# Patient Record
Sex: FEMALE | Race: WHITE | NOT HISPANIC OR LATINO | URBAN - METROPOLITAN AREA
[De-identification: names, ages, dates, MRNs, and addresses within clinical notes are randomized per-mention and may not be internally consistent; named-entity substitution may affect disease eponyms.]

---

## 2017-06-06 ENCOUNTER — OUTPATIENT (OUTPATIENT)
Dept: OUTPATIENT SERVICES | Facility: HOSPITAL | Age: 47
LOS: 1 days | Discharge: HOME | End: 2017-06-06

## 2017-06-28 DIAGNOSIS — K29.50 UNSPECIFIED CHRONIC GASTRITIS WITHOUT BLEEDING: ICD-10-CM

## 2017-06-28 DIAGNOSIS — Z88.1 ALLERGY STATUS TO OTHER ANTIBIOTIC AGENTS STATUS: ICD-10-CM

## 2017-06-28 DIAGNOSIS — R10.13 EPIGASTRIC PAIN: ICD-10-CM

## 2017-06-28 DIAGNOSIS — F17.210 NICOTINE DEPENDENCE, CIGARETTES, UNCOMPLICATED: ICD-10-CM

## 2017-06-28 DIAGNOSIS — G47.33 OBSTRUCTIVE SLEEP APNEA (ADULT) (PEDIATRIC): ICD-10-CM

## 2020-06-30 ENCOUNTER — OUTPATIENT (OUTPATIENT)
Dept: OUTPATIENT SERVICES | Facility: HOSPITAL | Age: 50
LOS: 1 days | Discharge: HOME | End: 2020-06-30
Payer: OTHER MISCELLANEOUS

## 2020-06-30 VITALS
SYSTOLIC BLOOD PRESSURE: 173 MMHG | DIASTOLIC BLOOD PRESSURE: 82 MMHG | HEART RATE: 82 BPM | TEMPERATURE: 98 F | WEIGHT: 145.06 LBS | HEIGHT: 65.5 IN | RESPIRATION RATE: 18 BRPM | OXYGEN SATURATION: 99 %

## 2020-06-30 DIAGNOSIS — Z01.818 ENCOUNTER FOR OTHER PREPROCEDURAL EXAMINATION: ICD-10-CM

## 2020-06-30 DIAGNOSIS — G56.02 CARPAL TUNNEL SYNDROME, LEFT UPPER LIMB: ICD-10-CM

## 2020-06-30 DIAGNOSIS — Z98.890 OTHER SPECIFIED POSTPROCEDURAL STATES: Chronic | ICD-10-CM

## 2020-06-30 LAB
ACANTHOCYTES BLD QL SMEAR: SLIGHT — SIGNIFICANT CHANGE UP
ALBUMIN SERPL ELPH-MCNC: 4.7 G/DL — SIGNIFICANT CHANGE UP (ref 3.5–5.2)
ALP SERPL-CCNC: 44 U/L — SIGNIFICANT CHANGE UP (ref 30–115)
ALT FLD-CCNC: 12 U/L — SIGNIFICANT CHANGE UP (ref 0–41)
ANION GAP SERPL CALC-SCNC: 11 MMOL/L — SIGNIFICANT CHANGE UP (ref 7–14)
ANISOCYTOSIS BLD QL: SLIGHT — SIGNIFICANT CHANGE UP
APTT BLD: 37.5 SEC — SIGNIFICANT CHANGE UP (ref 27–39.2)
AST SERPL-CCNC: 14 U/L — SIGNIFICANT CHANGE UP (ref 0–41)
BASOPHILS # BLD AUTO: 0.26 K/UL — HIGH (ref 0–0.2)
BASOPHILS NFR BLD AUTO: 1.8 % — HIGH (ref 0–1)
BILIRUB SERPL-MCNC: 0.5 MG/DL — SIGNIFICANT CHANGE UP (ref 0.2–1.2)
BUN SERPL-MCNC: 5 MG/DL — LOW (ref 10–20)
CALCIUM SERPL-MCNC: 10.1 MG/DL — SIGNIFICANT CHANGE UP (ref 8.5–10.1)
CHLORIDE SERPL-SCNC: 103 MMOL/L — SIGNIFICANT CHANGE UP (ref 98–110)
CO2 SERPL-SCNC: 30 MMOL/L — SIGNIFICANT CHANGE UP (ref 17–32)
CREAT SERPL-MCNC: 0.8 MG/DL — SIGNIFICANT CHANGE UP (ref 0.7–1.5)
ELLIPTOCYTES BLD QL SMEAR: SLIGHT — SIGNIFICANT CHANGE UP
EOSINOPHIL # BLD AUTO: 0.5 K/UL — SIGNIFICANT CHANGE UP (ref 0–0.7)
EOSINOPHIL NFR BLD AUTO: 3.5 % — SIGNIFICANT CHANGE UP (ref 0–8)
GIANT PLATELETS BLD QL SMEAR: PRESENT — SIGNIFICANT CHANGE UP
GLUCOSE SERPL-MCNC: 94 MG/DL — SIGNIFICANT CHANGE UP (ref 70–99)
HCT VFR BLD CALC: 46.9 % — SIGNIFICANT CHANGE UP (ref 37–47)
HGB BLD-MCNC: 15.6 G/DL — SIGNIFICANT CHANGE UP (ref 12–16)
INR BLD: 0.98 RATIO — SIGNIFICANT CHANGE UP (ref 0.65–1.3)
LYMPHOCYTES # BLD AUTO: 30.7 % — SIGNIFICANT CHANGE UP (ref 20.5–51.1)
LYMPHOCYTES # BLD AUTO: 4.35 K/UL — HIGH (ref 1.2–3.4)
MANUAL SMEAR VERIFICATION: SIGNIFICANT CHANGE UP
MCHC RBC-ENTMCNC: 30.4 PG — SIGNIFICANT CHANGE UP (ref 27–31)
MCHC RBC-ENTMCNC: 33.3 G/DL — SIGNIFICANT CHANGE UP (ref 32–37)
MCV RBC AUTO: 91.2 FL — SIGNIFICANT CHANGE UP (ref 81–99)
MONOCYTES # BLD AUTO: 1.49 K/UL — HIGH (ref 0.1–0.6)
MONOCYTES NFR BLD AUTO: 10.5 % — HIGH (ref 1.7–9.3)
NEUTROPHILS # BLD AUTO: 6.96 K/UL — HIGH (ref 1.4–6.5)
NEUTROPHILS NFR BLD AUTO: 45.6 % — SIGNIFICANT CHANGE UP (ref 42.2–75.2)
NEUTS BAND # BLD: 3.5 % — SIGNIFICANT CHANGE UP (ref 0–6)
PLAT MORPH BLD: NORMAL — SIGNIFICANT CHANGE UP
PLATELET # BLD AUTO: 531 K/UL — HIGH (ref 130–400)
POIKILOCYTOSIS BLD QL AUTO: SIGNIFICANT CHANGE UP
POLYCHROMASIA BLD QL SMEAR: SLIGHT — SIGNIFICANT CHANGE UP
POTASSIUM SERPL-MCNC: 5.7 MMOL/L — HIGH (ref 3.5–5)
POTASSIUM SERPL-SCNC: 5.7 MMOL/L — HIGH (ref 3.5–5)
PROT SERPL-MCNC: 6.9 G/DL — SIGNIFICANT CHANGE UP (ref 6–8)
PROTHROM AB SERPL-ACNC: 11.3 SEC — SIGNIFICANT CHANGE UP (ref 9.95–12.87)
RBC # BLD: 5.14 M/UL — SIGNIFICANT CHANGE UP (ref 4.2–5.4)
RBC # FLD: 15.1 % — HIGH (ref 11.5–14.5)
RBC BLD AUTO: SIGNIFICANT CHANGE UP
SCHISTOCYTES BLD QL AUTO: SLIGHT — SIGNIFICANT CHANGE UP
SMUDGE CELLS # BLD: PRESENT — SIGNIFICANT CHANGE UP
SODIUM SERPL-SCNC: 144 MMOL/L — SIGNIFICANT CHANGE UP (ref 135–146)
VARIANT LYMPHS # BLD: 4.4 % — SIGNIFICANT CHANGE UP (ref 0–5)
WBC # BLD: 14.17 K/UL — HIGH (ref 4.8–10.8)
WBC # FLD AUTO: 14.17 K/UL — HIGH (ref 4.8–10.8)

## 2020-06-30 PROCEDURE — 93010 ELECTROCARDIOGRAM REPORT: CPT

## 2020-06-30 NOTE — H&P PST ADULT - HISTORY OF PRESENT ILLNESS
for above recommended procedure due to work related injury 6/2019  pain since then no relief with any therapies    PATIENT CURRENTLY DENIES CHEST PAIN  SHORTNESS OF BREATH  PALPITATIONS,  DYSURIA, OR UPPER RESPIRATORY INFECTION IN PAST 2 WEEKS  EXERCISE  TOLERANCE  1-2 FLIGHT OF STAIRS  WITHOUT SHORTNESS OF BREATH   denies travel outside the [place] in the past 30 days   pt denies any covid s/s, or tested positive in the past 2 weeks

## 2020-06-30 NOTE — H&P PST ADULT - NSICDXPASTMEDICALHX_GEN_ALL_CORE_FT
PAST MEDICAL HISTORY:  Anemia     Cervical herniated disc     SHANNON (obstructive sleep apnea)     Seasonal allergies

## 2020-07-01 ENCOUNTER — OUTPATIENT (OUTPATIENT)
Dept: OUTPATIENT SERVICES | Facility: HOSPITAL | Age: 50
LOS: 1 days | Discharge: HOME | End: 2020-07-01

## 2020-07-01 DIAGNOSIS — Z98.890 OTHER SPECIFIED POSTPROCEDURAL STATES: Chronic | ICD-10-CM

## 2020-07-01 DIAGNOSIS — Z02.9 ENCOUNTER FOR ADMINISTRATIVE EXAMINATIONS, UNSPECIFIED: ICD-10-CM

## 2020-07-01 LAB
ANION GAP SERPL CALC-SCNC: 10 MMOL/L — SIGNIFICANT CHANGE UP (ref 7–14)
BUN SERPL-MCNC: 6 MG/DL — LOW (ref 10–20)
CALCIUM SERPL-MCNC: 9.8 MG/DL — SIGNIFICANT CHANGE UP (ref 8.5–10.1)
CHLORIDE SERPL-SCNC: 102 MMOL/L — SIGNIFICANT CHANGE UP (ref 98–110)
CO2 SERPL-SCNC: 28 MMOL/L — SIGNIFICANT CHANGE UP (ref 17–32)
CREAT SERPL-MCNC: 0.7 MG/DL — SIGNIFICANT CHANGE UP (ref 0.7–1.5)
GLUCOSE SERPL-MCNC: 85 MG/DL — SIGNIFICANT CHANGE UP (ref 70–99)
POTASSIUM SERPL-MCNC: 5.1 MMOL/L — HIGH (ref 3.5–5)
POTASSIUM SERPL-SCNC: 5.1 MMOL/L — HIGH (ref 3.5–5)
SODIUM SERPL-SCNC: 140 MMOL/L — SIGNIFICANT CHANGE UP (ref 135–146)

## 2020-07-06 ENCOUNTER — OUTPATIENT (OUTPATIENT)
Dept: OUTPATIENT SERVICES | Facility: HOSPITAL | Age: 50
LOS: 1 days | Discharge: HOME | End: 2020-07-06

## 2020-07-06 DIAGNOSIS — Z98.890 OTHER SPECIFIED POSTPROCEDURAL STATES: Chronic | ICD-10-CM

## 2020-07-06 DIAGNOSIS — Z11.59 ENCOUNTER FOR SCREENING FOR OTHER VIRAL DISEASES: ICD-10-CM

## 2020-07-06 PROBLEM — D64.9 ANEMIA, UNSPECIFIED: Chronic | Status: ACTIVE | Noted: 2020-06-30

## 2020-07-06 PROBLEM — M50.20 OTHER CERVICAL DISC DISPLACEMENT, UNSPECIFIED CERVICAL REGION: Chronic | Status: ACTIVE | Noted: 2020-06-30

## 2020-07-06 PROBLEM — J30.2 OTHER SEASONAL ALLERGIC RHINITIS: Chronic | Status: ACTIVE | Noted: 2020-06-30

## 2020-07-09 ENCOUNTER — OUTPATIENT (OUTPATIENT)
Dept: OUTPATIENT SERVICES | Facility: HOSPITAL | Age: 50
LOS: 1 days | Discharge: HOME | End: 2020-07-09
Payer: OTHER MISCELLANEOUS

## 2020-07-09 VITALS
RESPIRATION RATE: 20 BRPM | TEMPERATURE: 98 F | OXYGEN SATURATION: 98 % | DIASTOLIC BLOOD PRESSURE: 63 MMHG | SYSTOLIC BLOOD PRESSURE: 143 MMHG | HEART RATE: 75 BPM

## 2020-07-09 VITALS
DIASTOLIC BLOOD PRESSURE: 88 MMHG | WEIGHT: 145.06 LBS | OXYGEN SATURATION: 98 % | HEART RATE: 73 BPM | TEMPERATURE: 98 F | HEIGHT: 65.5 IN | SYSTOLIC BLOOD PRESSURE: 131 MMHG | RESPIRATION RATE: 18 BRPM

## 2020-07-09 DIAGNOSIS — Z98.890 OTHER SPECIFIED POSTPROCEDURAL STATES: Chronic | ICD-10-CM

## 2020-07-09 PROCEDURE — 93010 ELECTROCARDIOGRAM REPORT: CPT

## 2020-07-09 RX ORDER — FAMOTIDINE 10 MG/ML
20 INJECTION INTRAVENOUS DAILY
Refills: 0 | Status: DISCONTINUED | OUTPATIENT
Start: 2020-07-09 | End: 2020-07-24

## 2020-07-09 RX ORDER — SODIUM CHLORIDE 9 MG/ML
1000 INJECTION, SOLUTION INTRAVENOUS
Refills: 0 | Status: DISCONTINUED | OUTPATIENT
Start: 2020-07-09 | End: 2020-07-24

## 2020-07-09 RX ORDER — ONDANSETRON 8 MG/1
4 TABLET, FILM COATED ORAL ONCE
Refills: 0 | Status: DISCONTINUED | OUTPATIENT
Start: 2020-07-09 | End: 2020-07-24

## 2020-07-09 RX ORDER — OXYCODONE HYDROCHLORIDE 5 MG/1
5 TABLET ORAL ONCE
Refills: 0 | Status: DISCONTINUED | OUTPATIENT
Start: 2020-07-09 | End: 2020-07-09

## 2020-07-09 RX ORDER — HYDROMORPHONE HYDROCHLORIDE 2 MG/ML
0.5 INJECTION INTRAMUSCULAR; INTRAVENOUS; SUBCUTANEOUS
Refills: 0 | Status: DISCONTINUED | OUTPATIENT
Start: 2020-07-09 | End: 2020-07-09

## 2020-07-09 RX ADMIN — FAMOTIDINE 20 MILLIGRAM(S): 10 INJECTION INTRAVENOUS at 14:41

## 2020-07-09 RX ADMIN — SODIUM CHLORIDE 100 MILLILITER(S): 9 INJECTION, SOLUTION INTRAVENOUS at 14:53

## 2020-07-09 NOTE — BRIEF OPERATIVE NOTE - NSICDXBRIEFPOSTOP_GEN_ALL_CORE_FT
POST-OP DIAGNOSIS:  Ulnar neuritis, left 09-Jul-2020 14:02:19  Leona Hancock  Left carpal tunnel syndrome 09-Jul-2020 14:02:15  Leona Hancock

## 2020-07-09 NOTE — BRIEF OPERATIVE NOTE - NSICDXBRIEFPREOP_GEN_ALL_CORE_FT
PRE-OP DIAGNOSIS:  Ulnar neuritis, left 09-Jul-2020 14:02:06  Leona Hancock  Left carpal tunnel syndrome 09-Jul-2020 14:02:01  Leona Hancock

## 2020-07-09 NOTE — CHART NOTE - NSCHARTNOTEFT_GEN_A_CORE
PACU ANESTHESIA ADMISSION NOTE        ____  Intubated  TV:______       Rate: ______      FiO2: ______    _x___  Patent Airway    _x___  Full return of protective reflexes    _x___  Full recovery from anesthesia / back to baseline status    Vitals:  T(C): 36.8  HR: 68  BP: 157/67  RR: 18  SpO2: 98%    Mental Status:  _x___ Awake   _____ Alert   _____ Drowsy   _____ Sedated    Nausea/Vomiting:  _x___  NO       ______Yes,   See Post - Op Orders         Pain Scale (0-10):  __0___    Treatment: _x___ None    ____ See Post - Op/PCA Orders    Post - Operative Fluids:   __x__ Oral   ____ See Post - Op Orders    Plan: Discharge:   _x___Home       _____Floor     _____Critical Care    _____  Other:_________________    Comments:  No anesthesia issues or complications noted.  Discharge when criteria met.

## 2020-07-09 NOTE — BRIEF OPERATIVE NOTE - NSICDXBRIEFPROCEDURE_GEN_ALL_CORE_FT
PROCEDURES:  Release, nerve, elbow 09-Jul-2020 14:01:51 left Leona Hancock  Endoscopic carpal tunnel release 09-Jul-2020 14:01:46 left Leona Hancock

## 2020-07-15 DIAGNOSIS — F17.210 NICOTINE DEPENDENCE, CIGARETTES, UNCOMPLICATED: ICD-10-CM

## 2020-07-15 DIAGNOSIS — G56.02 CARPAL TUNNEL SYNDROME, LEFT UPPER LIMB: ICD-10-CM

## 2020-07-15 DIAGNOSIS — G56.22 LESION OF ULNAR NERVE, LEFT UPPER LIMB: ICD-10-CM

## 2020-07-15 DIAGNOSIS — G47.33 OBSTRUCTIVE SLEEP APNEA (ADULT) (PEDIATRIC): ICD-10-CM

## 2020-07-15 DIAGNOSIS — D64.9 ANEMIA, UNSPECIFIED: ICD-10-CM

## 2020-11-02 ENCOUNTER — OUTPATIENT (OUTPATIENT)
Dept: OUTPATIENT SERVICES | Facility: HOSPITAL | Age: 50
LOS: 1 days | Discharge: HOME | End: 2020-11-02
Payer: OTHER MISCELLANEOUS

## 2020-11-02 VITALS
HEIGHT: 65 IN | TEMPERATURE: 98 F | HEART RATE: 68 BPM | SYSTOLIC BLOOD PRESSURE: 146 MMHG | DIASTOLIC BLOOD PRESSURE: 76 MMHG | WEIGHT: 156.09 LBS | RESPIRATION RATE: 16 BRPM | OXYGEN SATURATION: 99 %

## 2020-11-02 DIAGNOSIS — M25.512 PAIN IN LEFT SHOULDER: ICD-10-CM

## 2020-11-02 DIAGNOSIS — Z01.818 ENCOUNTER FOR OTHER PREPROCEDURAL EXAMINATION: ICD-10-CM

## 2020-11-02 DIAGNOSIS — Z98.890 OTHER SPECIFIED POSTPROCEDURAL STATES: Chronic | ICD-10-CM

## 2020-11-02 LAB
ALBUMIN SERPL ELPH-MCNC: 4.3 G/DL — SIGNIFICANT CHANGE UP (ref 3.5–5.2)
ALP SERPL-CCNC: 48 U/L — SIGNIFICANT CHANGE UP (ref 30–115)
ALT FLD-CCNC: 10 U/L — SIGNIFICANT CHANGE UP (ref 0–41)
ANION GAP SERPL CALC-SCNC: 9 MMOL/L — SIGNIFICANT CHANGE UP (ref 7–14)
AST SERPL-CCNC: 13 U/L — SIGNIFICANT CHANGE UP (ref 0–41)
BASOPHILS # BLD AUTO: 0.33 K/UL — HIGH (ref 0–0.2)
BASOPHILS NFR BLD AUTO: 2.2 % — HIGH (ref 0–1)
BILIRUB SERPL-MCNC: 0.6 MG/DL — SIGNIFICANT CHANGE UP (ref 0.2–1.2)
BUN SERPL-MCNC: 5 MG/DL — LOW (ref 10–20)
CALCIUM SERPL-MCNC: 9.9 MG/DL — SIGNIFICANT CHANGE UP (ref 8.5–10.1)
CHLORIDE SERPL-SCNC: 107 MMOL/L — SIGNIFICANT CHANGE UP (ref 98–110)
CO2 SERPL-SCNC: 29 MMOL/L — SIGNIFICANT CHANGE UP (ref 17–32)
CREAT SERPL-MCNC: 0.8 MG/DL — SIGNIFICANT CHANGE UP (ref 0.7–1.5)
EOSINOPHIL # BLD AUTO: 0.51 K/UL — SIGNIFICANT CHANGE UP (ref 0–0.7)
EOSINOPHIL NFR BLD AUTO: 3.5 % — SIGNIFICANT CHANGE UP (ref 0–8)
GLUCOSE SERPL-MCNC: 87 MG/DL — SIGNIFICANT CHANGE UP (ref 70–99)
HCT VFR BLD CALC: 47.7 % — HIGH (ref 37–47)
HGB BLD-MCNC: 15.5 G/DL — SIGNIFICANT CHANGE UP (ref 12–16)
IMM GRANULOCYTES NFR BLD AUTO: 0.2 % — SIGNIFICANT CHANGE UP (ref 0.1–0.3)
LYMPHOCYTES # BLD AUTO: 35.3 % — SIGNIFICANT CHANGE UP (ref 20.5–51.1)
LYMPHOCYTES # BLD AUTO: 5.2 K/UL — HIGH (ref 1.2–3.4)
MCHC RBC-ENTMCNC: 29.5 PG — SIGNIFICANT CHANGE UP (ref 27–31)
MCHC RBC-ENTMCNC: 32.5 G/DL — SIGNIFICANT CHANGE UP (ref 32–37)
MCV RBC AUTO: 90.7 FL — SIGNIFICANT CHANGE UP (ref 81–99)
MONOCYTES # BLD AUTO: 1.02 K/UL — HIGH (ref 0.1–0.6)
MONOCYTES NFR BLD AUTO: 6.9 % — SIGNIFICANT CHANGE UP (ref 1.7–9.3)
NEUTROPHILS # BLD AUTO: 7.63 K/UL — HIGH (ref 1.4–6.5)
NEUTROPHILS NFR BLD AUTO: 51.9 % — SIGNIFICANT CHANGE UP (ref 42.2–75.2)
NRBC # BLD: 0 /100 WBCS — SIGNIFICANT CHANGE UP (ref 0–0)
PLATELET # BLD AUTO: 593 K/UL — HIGH (ref 130–400)
POTASSIUM SERPL-MCNC: 6.1 MMOL/L — CRITICAL HIGH (ref 3.5–5)
POTASSIUM SERPL-SCNC: 6.1 MMOL/L — CRITICAL HIGH (ref 3.5–5)
PROT SERPL-MCNC: 6.3 G/DL — SIGNIFICANT CHANGE UP (ref 6–8)
RBC # BLD: 5.26 M/UL — SIGNIFICANT CHANGE UP (ref 4.2–5.4)
RBC # FLD: 14.8 % — HIGH (ref 11.5–14.5)
SODIUM SERPL-SCNC: 145 MMOL/L — SIGNIFICANT CHANGE UP (ref 135–146)
WBC # BLD: 14.72 K/UL — HIGH (ref 4.8–10.8)
WBC # FLD AUTO: 14.72 K/UL — HIGH (ref 4.8–10.8)

## 2020-11-02 PROCEDURE — 93010 ELECTROCARDIOGRAM REPORT: CPT

## 2020-11-02 NOTE — H&P PST ADULT - NSICDXPASTSURGICALHX_GEN_ALL_CORE_FT
PAST SURGICAL HISTORY:  H/O carpal tunnel repair left 7/2020    History of surgery c section, cholecystectomy, tonsilectomy, hernia repair

## 2020-11-02 NOTE — H&P PST ADULT - HISTORY OF PRESENT ILLNESS
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 Denies COVID S/S. Denies sick contacts. Advised to follow preventive measures for COVID-Verbalized understanding of instructions. Exercise jackie 3FOS.  Anesthesia Alert  NO--Difficult Airway  NO--History of neck surgery or radiation  YES--Limited ROM of neck  NO--History of Malignant hyperthermia  NO--Personal or family history of Pseudocholinesterase deficiency  NO--Prior Anesthesia Complication  NO--Latex Allergy  NO--Loose teeth  NO--History of Rheumatoid Arthritis  YES--SHANNON  YES-SOY ALLERGY

## 2020-11-02 NOTE — H&P PST ADULT - NSICDXPASTMEDICALHX_GEN_ALL_CORE_FT
PAST MEDICAL HISTORY:  Anemia     Cervical herniated disc     Chronic sinusitis, unspecified location     H/O leukocytosis     SHANNON (obstructive sleep apnea) not using her CPAP    Seasonal allergies

## 2020-11-03 ENCOUNTER — OUTPATIENT (OUTPATIENT)
Dept: OUTPATIENT SERVICES | Facility: HOSPITAL | Age: 50
LOS: 1 days | Discharge: HOME | End: 2020-11-03

## 2020-11-03 DIAGNOSIS — Z02.9 ENCOUNTER FOR ADMINISTRATIVE EXAMINATIONS, UNSPECIFIED: ICD-10-CM

## 2020-11-03 DIAGNOSIS — Z98.890 OTHER SPECIFIED POSTPROCEDURAL STATES: Chronic | ICD-10-CM

## 2020-11-03 PROBLEM — J32.9 CHRONIC SINUSITIS, UNSPECIFIED: Chronic | Status: ACTIVE | Noted: 2020-11-02

## 2020-11-03 PROBLEM — Z86.2 PERSONAL HISTORY OF DISEASES OF THE BLOOD AND BLOOD-FORMING ORGANS AND CERTAIN DISORDERS INVOLVING THE IMMUNE MECHANISM: Chronic | Status: ACTIVE | Noted: 2020-11-02

## 2020-11-03 LAB
POTASSIUM SERPL-MCNC: 5 MMOL/L — SIGNIFICANT CHANGE UP (ref 3.5–5)
POTASSIUM SERPL-SCNC: 5 MMOL/L — SIGNIFICANT CHANGE UP (ref 3.5–5)

## 2020-11-20 ENCOUNTER — OUTPATIENT (OUTPATIENT)
Dept: OUTPATIENT SERVICES | Facility: HOSPITAL | Age: 50
LOS: 1 days | Discharge: HOME | End: 2020-11-20

## 2020-11-20 DIAGNOSIS — Z98.890 OTHER SPECIFIED POSTPROCEDURAL STATES: Chronic | ICD-10-CM

## 2020-11-20 DIAGNOSIS — Z02.9 ENCOUNTER FOR ADMINISTRATIVE EXAMINATIONS, UNSPECIFIED: ICD-10-CM

## 2020-11-20 DIAGNOSIS — Z11.59 ENCOUNTER FOR SCREENING FOR OTHER VIRAL DISEASES: ICD-10-CM

## 2020-11-20 PROBLEM — G47.33 OBSTRUCTIVE SLEEP APNEA (ADULT) (PEDIATRIC): Chronic | Status: ACTIVE | Noted: 2020-06-30

## 2020-11-20 LAB
APTT BLD: 34.9 SEC — SIGNIFICANT CHANGE UP (ref 27–39.2)
INR BLD: 1.04 RATIO — SIGNIFICANT CHANGE UP (ref 0.65–1.3)
PROTHROM AB SERPL-ACNC: 12 SEC — SIGNIFICANT CHANGE UP (ref 9.95–12.87)

## 2020-11-20 NOTE — ASU PATIENT PROFILE, ADULT - PSH
H/O carpal tunnel repair  left 7/2020  History of surgery  c section, cholecystectomy, tonsilectomy, hernia repair

## 2020-11-20 NOTE — ASU PATIENT PROFILE, ADULT - PMH
Anemia    Cervical herniated disc    Chronic sinusitis, unspecified location    H/O leukocytosis    SHANNON (obstructive sleep apnea)  not using her CPAP  Seasonal allergies

## 2020-11-20 NOTE — ASU PATIENT PROFILE, ADULT - BARIATRIC
See Scanned Documents.  
Rooming documentation of social history includes tobacco screening only.    
no

## 2020-11-23 ENCOUNTER — RESULT REVIEW (OUTPATIENT)
Age: 50
End: 2020-11-23

## 2020-11-23 ENCOUNTER — OUTPATIENT (OUTPATIENT)
Dept: OUTPATIENT SERVICES | Facility: HOSPITAL | Age: 50
LOS: 1 days | Discharge: HOME | End: 2020-11-23
Payer: OTHER MISCELLANEOUS

## 2020-11-23 VITALS
RESPIRATION RATE: 20 BRPM | OXYGEN SATURATION: 97 % | HEART RATE: 90 BPM | DIASTOLIC BLOOD PRESSURE: 78 MMHG | SYSTOLIC BLOOD PRESSURE: 155 MMHG

## 2020-11-23 VITALS
RESPIRATION RATE: 18 BRPM | HEIGHT: 65 IN | OXYGEN SATURATION: 99 % | DIASTOLIC BLOOD PRESSURE: 80 MMHG | WEIGHT: 156.09 LBS | TEMPERATURE: 98 F | SYSTOLIC BLOOD PRESSURE: 116 MMHG | HEART RATE: 76 BPM

## 2020-11-23 DIAGNOSIS — Z98.890 OTHER SPECIFIED POSTPROCEDURAL STATES: Chronic | ICD-10-CM

## 2020-11-23 DIAGNOSIS — Z87.39 PERSONAL HISTORY OF OTHER DISEASES OF THE MUSCULOSKELETAL SYSTEM AND CONNECTIVE TISSUE: ICD-10-CM

## 2020-11-23 PROCEDURE — 88304 TISSUE EXAM BY PATHOLOGIST: CPT | Mod: 26

## 2020-11-23 RX ORDER — SODIUM CHLORIDE 9 MG/ML
1000 INJECTION, SOLUTION INTRAVENOUS
Refills: 0 | Status: DISCONTINUED | OUTPATIENT
Start: 2020-11-23 | End: 2020-12-07

## 2020-11-23 RX ORDER — ONDANSETRON 8 MG/1
4 TABLET, FILM COATED ORAL ONCE
Refills: 0 | Status: DISCONTINUED | OUTPATIENT
Start: 2020-11-23 | End: 2020-12-07

## 2020-11-23 RX ORDER — HYDROMORPHONE HYDROCHLORIDE 2 MG/ML
0.5 INJECTION INTRAMUSCULAR; INTRAVENOUS; SUBCUTANEOUS
Refills: 0 | Status: DISCONTINUED | OUTPATIENT
Start: 2020-11-23 | End: 2020-11-23

## 2020-11-23 RX ORDER — MORPHINE SULFATE 50 MG/1
2 CAPSULE, EXTENDED RELEASE ORAL
Refills: 0 | Status: DISCONTINUED | OUTPATIENT
Start: 2020-11-23 | End: 2020-11-23

## 2020-11-23 RX ORDER — OMEPRAZOLE 10 MG/1
1 CAPSULE, DELAYED RELEASE ORAL
Qty: 0 | Refills: 0 | DISCHARGE

## 2020-11-23 RX ORDER — IRON,CARBONYL 45 MG
1 TABLET ORAL
Qty: 0 | Refills: 0 | DISCHARGE

## 2020-11-23 RX ORDER — ACETAMINOPHEN 500 MG
650 TABLET ORAL ONCE
Refills: 0 | Status: DISCONTINUED | OUTPATIENT
Start: 2020-11-23 | End: 2020-12-07

## 2020-11-23 RX ORDER — CETIRIZINE HYDROCHLORIDE 10 MG/1
1 TABLET ORAL
Qty: 0 | Refills: 0 | DISCHARGE

## 2020-11-23 NOTE — PACU DISCHARGE NOTE - COMMENTS
Pt now SP GETA with supplemental left interscalene block without complication. See anesthesia record for PACU admission vital signs.

## 2020-11-23 NOTE — ASU DISCHARGE PLAN (ADULT/PEDIATRIC) - ASU DC SPECIAL INSTRUCTIONSFT
Post Operative Instructions for Shoulder Surgery    Your Surgery Included  [x ] Rotator Cuff Repair			  [x ] Debridement				  [x ] Biceps Tenodesis/Tenotomy	  [ ] Distal Clavicle Resection		  [ ] SLAP Repair  [ ] Instability Repair  [x ] Lysis of Adhesions/Manipulation  [ ] Other:  	  Call our office ([phone]) immediately if you experience any of the following:  •	Excessive bleeding or pus like drainage at the incision site  •	Uncontrollable pain not relieved by pain medication  •	Excessive swelling or redness at the incision site  •	Fever above 101.5 degrees not controlled with Tylenol or Motrin  •	Shortness of Breath  •	Any foul odor or blistering from the surgery site    Pain Management: You were given one or more of the following medication prescriptions before leaving the hospital. Have the prescriptions filled at a pharmacy on your way home and follow the instructions on the bottles.   Regional Anesthesia Injections (Blocks): You may have been given a regional nerve block either before or after surgery. This may numb your shoulder for 24-36 hours    Diet: Eat a bland diet for the first day after surgery. Progress your diet as tolerated. Constipation may occur with Narcotic usage, contact our office if you are experiencing constipation.    Activity: After you arrive at home, spend most of the first 24 hours resting in bed, on the couch, or in a reclining chair. After the first 24 hours at home, slowly increase your activity level based on your symptoms.    Dressing Change: Remove the dressing on the 3rd day. It is normal for some blood to be seen on the dressings. It is also normal for you to see apparent bruising on the skin around your shoulder when you remove the dressing. If present, leave the steri-strip tape across the incisions. If you are concerned by the drainage or the appearance of your shoulder, please call one of the numbers listed below. Keep incisions covered with Band-Aids/bandages.    Showering: You may shower on the 5th day after surgery if the wound is dry and clean, but do not let the wound soak in water until sutures are removed. Do not submerge in any water until after your postoperative appointment in clinic.    Shoulder Sling: You may have been sent home with a sling / pillow attachment holding your arm away from your body. You may remove the sling when changing clothes or bathing. Make sure to wear the sling while sleeping unless instructed otherwise. You may remove for exercises.    Shoulder Exercises: You may do these exercises for 2-5 mins five times a day in order to help regain your range of motion.  [x ] Shoulder Shrugs: Shrug your shoulders up and down  [x ] Pendulums: Bend forward allowing your arm to hang down in front of you. Gently swing your arm side-to-side and front to back  [x ] Elbow range of motion: Straighten and bend your elbow  [x ] Scapula Retractions: Squeeze shoulder blades together while slightly pulling them down  [ ] Passive Abduction: Have family member lift your arm away from your body bringing your elbow to the level of your shoulder  [ ] Shoulder rotation: With your arm at your side, have a family member rotate your arm internally and externally  [ ] Pulley exercises: Put a towel over the top of a door and face the door, use your good arm to pull your arm up in front of you    Follow Up: As Scheduled

## 2020-11-23 NOTE — BRIEF OPERATIVE NOTE - NSICDXBRIEFPROCEDURE_GEN_ALL_CORE_FT
PROCEDURES:  Shoulder adhesion release 23-Nov-2020 14:33:48  Jace Estrada  Subacromial decompression 23-Nov-2020 14:33:40  Jace Estrada  Arthroscopic debridement, shoulder, extensive 23-Nov-2020 14:33:33  Jace Estrada  Repair, rotator cuff, arthroscopic 23-Nov-2020 14:33:27  Jace Estrada

## 2020-11-25 LAB — SURGICAL PATHOLOGY STUDY: SIGNIFICANT CHANGE UP

## 2020-12-01 DIAGNOSIS — Z88.1 ALLERGY STATUS TO OTHER ANTIBIOTIC AGENTS STATUS: ICD-10-CM

## 2020-12-01 DIAGNOSIS — M75.112 INCOMPLETE ROTATOR CUFF TEAR OR RUPTURE OF LEFT SHOULDER, NOT SPECIFIED AS TRAUMATIC: ICD-10-CM

## 2020-12-01 DIAGNOSIS — M24.112 OTHER ARTICULAR CARTILAGE DISORDERS, LEFT SHOULDER: ICD-10-CM

## 2020-12-01 DIAGNOSIS — G47.33 OBSTRUCTIVE SLEEP APNEA (ADULT) (PEDIATRIC): ICD-10-CM

## 2020-12-01 DIAGNOSIS — M75.52 BURSITIS OF LEFT SHOULDER: ICD-10-CM

## 2020-12-01 DIAGNOSIS — M25.812 OTHER SPECIFIED JOINT DISORDERS, LEFT SHOULDER: ICD-10-CM

## 2021-07-22 NOTE — H&P PST ADULT - MS EXT PE MLT D E PC
Assessment / Plan  1. Right wrist pain  Please call to schedule:   Orthopedics: [phone]  - SERVICE TO ORTHOPEDICS    2. Anxiety  - Ashwagandha 500mg to 1500mg every morning as needed (Amazing Annette)  - hydroxyzine as needed at night for anxiety / sleep  - stop Lexapro  - THYROID STIMULATING HORMONE REFLEX; Future    3. Arthralgia of multiple sites  - possibly related to Lexapro, right wrist likely related to tendonitis although not improved with relafen and brace  - SEDIMENTATION RATE WESTERGREN; Future  - KIKI GABRIELA; Future  - C REACTIVE PROTEIN; Future  - CYCLIC CITRULLINATED PEPTIDE (CCP) AB (IGG); Future  - RHEUMATOID FACTOR; Future  - CBC WITH DIFFERENTIAL; Future    4. Routine lab draw  - has appointment for annual in October  - LIPID PANEL WITH REFLEX; Future  - COMPREHENSIVE MET PNL (FAST); Future     no cyanosis/no clubbing/no pedal edema

## 2022-01-12 NOTE — BRIEF OPERATIVE NOTE - ANTIBIOTIC PROTOCOL
Followed protocol Ilumya Counseling: I discussed with the patient the risks of tildrakizumab including but not limited to immunosuppression, malignancy, posterior leukoencephalopathy syndrome, and serious infections.  The patient understands that monitoring is required including a PPD at baseline and must alert us or the primary physician if symptoms of infection or other concerning signs are noted.

## 2022-05-18 ENCOUNTER — APPOINTMENT (OUTPATIENT)
Dept: ENDOCRINOLOGY | Facility: CLINIC | Age: 52
End: 2022-05-18

## 2022-05-18 ENCOUNTER — OUTPATIENT (OUTPATIENT)
Dept: OUTPATIENT SERVICES | Facility: HOSPITAL | Age: 52
LOS: 1 days | Discharge: HOME | End: 2022-05-18

## 2022-05-18 VITALS
BODY MASS INDEX: 24.69 KG/M2 | HEIGHT: 65.5 IN | SYSTOLIC BLOOD PRESSURE: 127 MMHG | DIASTOLIC BLOOD PRESSURE: 82 MMHG | HEART RATE: 80 BPM | TEMPERATURE: 98.2 F | WEIGHT: 150 LBS

## 2022-05-18 DIAGNOSIS — Z98.890 OTHER SPECIFIED POSTPROCEDURAL STATES: Chronic | ICD-10-CM

## 2022-05-18 DIAGNOSIS — E04.2 NONTOXIC MULTINODULAR GOITER: ICD-10-CM

## 2022-05-18 DIAGNOSIS — F32.A DEPRESSION, UNSPECIFIED: ICD-10-CM

## 2022-05-18 DIAGNOSIS — F32.5 MAJOR DEPRESSIVE DISORDER, SINGLE EPISODE, IN FULL REMISSION: ICD-10-CM

## 2022-05-18 DIAGNOSIS — E78.00 PURE HYPERCHOLESTEROLEMIA, UNSPECIFIED: ICD-10-CM

## 2022-05-18 PROBLEM — Z00.00 ENCOUNTER FOR PREVENTIVE HEALTH EXAMINATION: Status: ACTIVE | Noted: 2022-05-18

## 2022-05-18 NOTE — HISTORY OF PRESENT ILLNESS
[FreeTextEntry1] : patient has a multinodular goiter with technical difficulties during thyroid bipsy.

## 2022-05-18 NOTE — PHYSICAL EXAM

## 2022-06-21 ENCOUNTER — FORM ENCOUNTER (OUTPATIENT)
Age: 52
End: 2022-06-21

## 2022-07-18 ENCOUNTER — RX RENEWAL (OUTPATIENT)
Age: 52
End: 2022-07-18

## 2022-08-10 ENCOUNTER — APPOINTMENT (OUTPATIENT)
Dept: ORTHOPEDIC SURGERY | Facility: AMBULATORY SURGERY CENTER | Age: 52
End: 2022-08-10

## 2022-08-10 PROCEDURE — 29824 SHO ARTHRS SRG DSTL CLAVICLC: CPT | Mod: LT

## 2022-08-10 PROCEDURE — 29823 SHO ARTHRS SRG XTNSV DBRDMT: CPT | Mod: LT

## 2022-08-18 ENCOUNTER — APPOINTMENT (OUTPATIENT)
Dept: ORTHOPEDIC SURGERY | Facility: CLINIC | Age: 52
End: 2022-08-18

## 2022-08-18 PROCEDURE — 99024 POSTOP FOLLOW-UP VISIT: CPT

## 2022-08-18 NOTE — DISCUSSION/SUMMARY
[de-identified] :  Patient is doing well status post surgery and her pain has been well controlled.  She will continue physical therapy as directed.\par \par The patient was advised to rest/ice the area and can alternate with warm compresses as needed.  Instructed not to do any strenuous activity that would further aggravate their symptoms.\par \par She will take Tylenol or Motrin as needed for pain.  Patient will be out of work until her next evaluation and has a 100% temporary total disability.\par \par Patient will follow-up in 6 weeks for further evaluation.  All of the patient's questions/concerns were answered in detail.  \par \par Patient was seen under the supervision of Dr. Marley.\par

## 2022-08-18 NOTE — HISTORY OF PRESENT ILLNESS
[de-identified] :   Patient is a 52 year old female who reports office for 1st postop visit status post left shoulder arthroscopy/manipulation of adhesions done by Dr. Estrada on 08/10/2022.  She has been doing physical therapy which has been giving her relief with range of motion.  Her pain has been well controlled.

## 2022-08-18 NOTE — PHYSICAL EXAM
[Left] : left shoulder [Sitting] : sitting [] : negative Speed's [de-identified] : There is decent strength [TWNoteComboBox7] : active forward flexion 150 degrees [TWNoteComboBox4] : passive forward flexion 170 degrees [de-identified] : active abduction 100 degrees [TWNoteComboBox9] : passive abduction 150 degrees [TWNoteComboBox6] : internal rotation L2 [de-identified] : external rotation 90 degrees

## 2022-10-04 ENCOUNTER — APPOINTMENT (OUTPATIENT)
Dept: ORTHOPEDIC SURGERY | Facility: CLINIC | Age: 52
End: 2022-10-04

## 2022-10-04 PROCEDURE — 99024 POSTOP FOLLOW-UP VISIT: CPT

## 2022-10-04 NOTE — HISTORY OF PRESENT ILLNESS
[de-identified] : Pt is s/p left shoulder arthroscopy\par Doing well.\par Pain controlled\par \par NAD\par Left shoulder\par Incisions healed\par Mild diffuse TTP\par Compartments soft and NT\par ff 160\par er 50\par IR L1\par NVI\par \par s/p left shoulder arthroscopy\par went over the surgery in detail\par cont cons tx\par new pt script\par not working with temp total disability

## 2022-10-19 ENCOUNTER — APPOINTMENT (OUTPATIENT)
Dept: ORTHOPEDIC SURGERY | Facility: CLINIC | Age: 52
End: 2022-10-19

## 2022-10-31 ENCOUNTER — APPOINTMENT (OUTPATIENT)
Dept: ORTHOPEDIC SURGERY | Facility: CLINIC | Age: 52
End: 2022-10-31

## 2022-10-31 PROCEDURE — 99214 OFFICE O/P EST MOD 30 MIN: CPT | Mod: 24

## 2022-10-31 PROCEDURE — 99072 ADDL SUPL MATRL&STAF TM PHE: CPT

## 2022-10-31 NOTE — ASSESSMENT
[FreeTextEntry1] : Patient comes in for follow-up of her bilateral carpal tunnel and cubital tunnel.  She was post to have surgery on the right side however she has been dealing with a frozen shoulder on the left side.  She underwent another surgery and she is doing significantly better and is in therapy on the left side.  It is going to be another couple months.  She is now complaining of numbness and tingling in both of her hands.  She says it is basically the long finger through small fingers.  Then numbness and tingling resolving left side after I had done surgery however returned after having the problem with the shoulder.  She says the right side is worse than the left-hand side.  The right hand has numbness and tingling as well.\par \par B/L elbow:\par Tender at the ulna nerve\par Pain with flexion\par +tinels at the ulna nerve\par +hyperflexion test\par \par B/L hand: \par Tender volar wrist \par Good finger ROM \par +Tinels \par +Phalens \par +Compression test \par normal sensation\par \par We discussed the recommendation for cubital tunnel surgery- We reviewed that the goal of surgery is to prevent worsening and give the nerve a chance to recover.  If symptoms are constant there is a chance that the nerve is already too badly damaged and no longer has the potential to recover.  In addition the nerve recovers at the rate of an inch per month so recovery of hand function from compression of a nerve at the elbow can take many months to recover.  Motor end plates may degenerate prior to nerve recovery and therefore strength and atrophy may never recover.  Risks, benefits, and alternatives of surgery discussed with patient (and family). Risks including but not limited to infection, blood loss, tendon damage, muscle damage, nerve damage, stiffness, pain, no resolution of symptoms, CRPS, loss of function, potential for secondary surgery, loss of limb or life. Patient understands and was allowed to voice questions or concerns. They agree to surgery\par \par We discussed the recommendation for carpal tunnel surgery- We reviewed that the goal of surgery is to prevent worsening and give the nerve a chance to recover. If symptoms are constant there is a chance that the nerve is already too badly damaged and no longer has the potential to recover.Risks, benefits, and alternatives of surgery discussed with patient (and family).Risks including but not limited to infection, blood loss, tendon damage, muscle damage, nerve damage, stiffness, pain, no resolution of symptoms, CRPS, loss of function, potential for secondary surgery, loss of limb or life.Patient understands and was allowed to voice questions or concerns.They agree to surgery\par \par   Has bilateral carpal tunnel and cubital tunnel.  I believe that we should move forward with the right-hand side.  She the cubital tunnel bothers her as well as the carpal tunnel.  There are plenty of people who unfortunately come out negative for cubital tunnel an EMG NCV however still have it.  She has symptoms.  She also has symptoms of carpal tunnel.  The EMG NCV showed carpal tunnel.  I am recommending right endoscopic carpal tunnel release possible open and cubital tunnel release possible anterior transposition with adipofascial fat flap.  We need authorization for these.  She will follow up with me in another 2 months if we have not got authorization.  In addition we will consider a Medrol Dosepak to help with the numbness and tingling and swelling.

## 2022-11-02 ENCOUNTER — FORM ENCOUNTER (OUTPATIENT)
Age: 52
End: 2022-11-02

## 2022-11-14 ENCOUNTER — FORM ENCOUNTER (OUTPATIENT)
Age: 52
End: 2022-11-14

## 2022-11-16 ENCOUNTER — APPOINTMENT (OUTPATIENT)
Dept: ORTHOPEDIC SURGERY | Facility: CLINIC | Age: 52
End: 2022-11-16

## 2022-11-16 PROCEDURE — 99213 OFFICE O/P EST LOW 20 MIN: CPT

## 2022-11-16 PROCEDURE — 99072 ADDL SUPL MATRL&STAF TM PHE: CPT

## 2022-11-18 NOTE — ASSESSMENT
[FreeTextEntry1] :  recommended a new MRI  of the ankle suggested  additional physical therapy renewed Flexeril she remains totally disabled unable to work I will see her back in a few months\par  I did express concern that some of the symptoms present may be permanent

## 2022-11-18 NOTE — HISTORY OF PRESENT ILLNESS
[de-identified] : Kimo is a 52-year-old female seen  for follow up regarding her left ankle pain. she states that on June\par 15th 2019 she had an injury injured her ankles. The left side is still quite painful she has\par had MRIs in 2019 which were reviewed . She states the beginning she did do therapy but was not given very many\par visits she continues to complain of pain is mainly anterior and posterior  ankle She did get an MRI she had not been taking\par anti-inflammatories she continues to complain of pain. not yet able to go back to work. Her MRI had showed mild\par Achilles tendinosis and no other significant abnormalities no signs tarsal tunnel syndrome. She states that she continues\par to feel tight have pain posteriorly even though she has been going to therapy. Her therapist thinks she has made some\par improvements but is still tight. She also states that she is now getting a pain that goes up the medial side of the ankle\par to her knee will cause her knee to give out on her. She is concerned this is a sign of tarsal tunnel syndrome did have\par EMGs done 02/07/2022 her endurance is poor      able to walk about a block  and gets swollen did request therapy last visit\par but is not yet been granted

## 2022-11-18 NOTE — REASON FOR VISIT
[FreeTextEntry2] : work injury of 6/15/19 to her both ankles a\par re recently had surgery Dr. Estrada in August on her shoulder Dr. Hancock is requesting surgery for her right wrist last MRI left ankle 08/26/2021 therapy so far has not helped her ankle or calf

## 2022-11-18 NOTE — IMAGING
[de-identified] : Left Ankle: Inspection of the ankle, foot and lower leg is as follows: no abrasions or lacerations, no swelling, no\par erythema, no ecchymosis, no gross deformity and no swelling of calf \par Palpation of the ankle, foot and lower leg is as follows: Tenderness at Achilles tendon, lateral ligaments,\par Achilles tendon insertion and anterior ankle joint line. No tenderness at calf, lateral malleolus, medial malleolus,\par deltoid ligaments, lisfranc's joint, fifth metatarsal tuberosity, calcaneus, posterolateral ankle, posteromedial ankle and\par calcaneocuboid joint. No tenderness over peroneal tendons and posterior tibialis tendon. \par Range of motion of the ankle, foot and lower leg is as follows: Dorsiflexion to 15 degrees. Plantar flexion to\par 30 degrees. Inversion to 20 degrees. Eversion to 15 degrees. Strength testing of the ankle, foot and lower leg is\par as follows: Ankle dorsiflexion strength is 5/5, Ankle plantar flexion strength is 5/5, Ankle inversion strength is 5/5, Ankle\par eversion strength is 5/5, EHL strength is 5/5 and FHL strength is 5/5. Special Testing of the ankle, foot and lower leg\par are as follows: negative anterior drawer at ankle, normal Brito test, negative squeeze test at foot, no pain with\par external rotation of ankle and circumduction without pain. No mid-foot instability. \par Vascular testing of the ankle, foot and lower leg are as follows: Warm and well perfused. \par Sensation testing of the ankle, foot and lower leg are as follows: Sensation present to light touch in all\par distributions. Gait and function is as follows: non-antalgic gait and patient ambulates without assistive device.

## 2023-01-03 ENCOUNTER — APPOINTMENT (OUTPATIENT)
Dept: ORTHOPEDIC SURGERY | Facility: CLINIC | Age: 53
End: 2023-01-03
Payer: OTHER MISCELLANEOUS

## 2023-01-03 DIAGNOSIS — M77.12 LATERAL EPICONDYLITIS, LEFT ELBOW: ICD-10-CM

## 2023-01-03 PROCEDURE — 99214 OFFICE O/P EST MOD 30 MIN: CPT

## 2023-01-03 PROCEDURE — 99072 ADDL SUPL MATRL&STAF TM PHE: CPT

## 2023-01-03 NOTE — WORK
[Total] : total [Does not reveal pre-existing condition(s) that may affect treatment/prognosis] : does not reveal pre-existing condition(s) that may affect treatment/prognosis [Cannot return to work because ________] : cannot return to work because [unfilled] [I provided the services listed above] :  I provided the services listed above. [FreeTextEntry3] : lg

## 2023-01-03 NOTE — ASSESSMENT
[FreeTextEntry1] : Patient comes in for follow-up of her bilateral carpal tunnel and cubital tunnel.  She still having numbness and tingling.  She is having left elbow pain.  The numbness and tingling occurs sometimes on off.  The right side is worse than the left side.  The left elbow hurts her tremendously.\par \par B/L elbow:\par Tender at the ulna nerve\par Pain with flexion\par +tinels at the ulna nerve\par +hyperflexion test\par \par full active range of motion of the left shoulder, wrist and fingers\par full active range of motion of the left elbow\par Tenderness to palpation of the lateral epicondyle and proximal extensor supinator mass\par pain with resisted wrist extension and forearm supination\par 4/5 strength in supination and wrist extension, otherwise 5/5 strength\par median/ulnar/radial sensation intact to light touch\par ain/pin/ulnar motor intact\par palpable pulses CR<2s\par \par \par B/L hand: \par Tender volar wrist \par Good finger ROM \par +Tinels \par +Phalens \par +Compression test \par normal sensation\par \par We discussed the recommendation for cubital tunnel surgery- We reviewed that the goal of surgery is to prevent worsening and give the nerve a chance to recover.  If symptoms are constant there is a chance that the nerve is already too badly damaged and no longer has the potential to recover.  In addition the nerve recovers at the rate of an inch per month so recovery of hand function from compression of a nerve at the elbow can take many months to recover.  Motor end plates may degenerate prior to nerve recovery and therefore strength and atrophy may never recover.  Risks, benefits, and alternatives of surgery discussed with patient (and family). Risks including but not limited to infection, blood loss, tendon damage, muscle damage, nerve damage, stiffness, pain, no resolution of symptoms, CRPS, loss of function, potential for secondary surgery, loss of limb or life. Patient understands and was allowed to voice questions or concerns. They agree to surgery\par \par We discussed the recommendation for carpal tunnel surgery- We reviewed that the goal of surgery is to prevent worsening and give the nerve a chance to recover. If symptoms are constant there is a chance that the nerve is already too badly damaged and no longer has the potential to recover.Risks, benefits, and alternatives of surgery discussed with patient (and family).Risks including but not limited to infection, blood loss, tendon damage, muscle damage, nerve damage, stiffness, pain, no resolution of symptoms, CRPS, loss of function, potential for secondary surgery, loss of limb or life.Patient understands and was allowed to voice questions or concerns.They agree to surgery\par \par   Has bilateral carpal tunnel and cubital tunnel.  I believe that we should move forward with the right-hand side.  She the cubital tunnel bothers her as well as the carpal tunnel.  There are plenty of people who unfortunately come out negative for cubital tunnel an EMG NCV however still have it.  She has symptoms.  She also has symptoms of carpal tunnel.  The EMG NCV showed carpal tunnel.  I am recommending right endoscopic carpal tunnel release possible open and cubital tunnel release possible anterior transposition with adipofascial fat flap.  We need authorization for these.  She will follow up with me in another 2 months if we have not got authorization.  \par \par  there is no evidence of steroid injections helped for cubital tunnel symptoms.  The patient has had carpal tunnel for over 2 years.  The right side is not doing well.  I am recommending surgical intervention for the right side.  I gave her Medrol Dosepak for the left side.  As for the left elbow she has now lateral epicondylitis.  She says she has been having this pain ever since she has had her shoulder surgery.\par \par The patient was advised of the diagnosis. The natural history of the pathology was explained in full to the patient in layman's terms. All questions were answered. The risks and benefits of surgical and non-surgical treatment alternatives were explained in full to the patient.\par We discussed treatment options including nsaids, topical gels, tennis elbow strap, PT, activity modification, cortisone inj, sx .pt is aware that symptoms usually resolve on their own in 95-99% of people, but the timeframe is unknown.\par Home exercises/stretches were demonstrated and the patient practiced them as well- They are to do the exercises hourly and hold the stretch for 30 seconds. \par Avoid repetitive wrist flexion\par time was spent instructing the patient on appropriate placement of the elbow strap as well. \par In addition I would like authorization for therapy for the elbow.\par

## 2023-01-23 ENCOUNTER — FORM ENCOUNTER (OUTPATIENT)
Age: 53
End: 2023-01-23

## 2023-01-31 ENCOUNTER — APPOINTMENT (OUTPATIENT)
Dept: RHEUMATOLOGY | Facility: CLINIC | Age: 53
End: 2023-01-31
Payer: COMMERCIAL

## 2023-01-31 VITALS
HEART RATE: 82 BPM | DIASTOLIC BLOOD PRESSURE: 80 MMHG | SYSTOLIC BLOOD PRESSURE: 120 MMHG | WEIGHT: 160 LBS | HEIGHT: 65 IN | OXYGEN SATURATION: 98 % | BODY MASS INDEX: 26.66 KG/M2

## 2023-01-31 DIAGNOSIS — R76.8 OTHER SPECIFIED ABNORMAL IMMUNOLOGICAL FINDINGS IN SERUM: ICD-10-CM

## 2023-01-31 DIAGNOSIS — Z80.9 FAMILY HISTORY OF MALIGNANT NEOPLASM, UNSPECIFIED: ICD-10-CM

## 2023-01-31 DIAGNOSIS — Z87.19 PERSONAL HISTORY OF OTHER DISEASES OF THE DIGESTIVE SYSTEM: ICD-10-CM

## 2023-01-31 DIAGNOSIS — Z82.62 FAMILY HISTORY OF OSTEOPOROSIS: ICD-10-CM

## 2023-01-31 PROCEDURE — 99204 OFFICE O/P NEW MOD 45 MIN: CPT

## 2023-01-31 RX ORDER — OMEPRAZOLE 20 MG/1
20 CAPSULE, DELAYED RELEASE ORAL
Refills: 0 | Status: ACTIVE | COMMUNITY

## 2023-01-31 NOTE — PHYSICAL EXAM
[General Appearance - Alert] : alert [General Appearance - In No Acute Distress] : in no acute distress [Sclera] : the sclera and conjunctiva were normal [PERRL With Normal Accommodation] : pupils were equal in size, round, and reactive to light [Extraocular Movements] : extraocular movements were intact [Outer Ear] : the ears and nose were normal in appearance [Oropharynx] : the oropharynx was normal [Neck Appearance] : the appearance of the neck was normal [Neck Cervical Mass (___cm)] : no neck mass was observed [Jugular Venous Distention Increased] : there was no jugular-venous distention [Thyroid Diffuse Enlargement] : the thyroid was not enlarged [Thyroid Nodule] : there were no palpable thyroid nodules [Auscultation Breath Sounds / Voice Sounds] : lungs were clear to auscultation bilaterally [Heart Rate And Rhythm] : heart rate was normal and rhythm regular [Heart Sounds] : normal S1 and S2 [Heart Sounds Gallop] : no gallops [Murmurs] : no murmurs [Heart Sounds Pericardial Friction Rub] : no pericardial rub [Bowel Sounds] : normal bowel sounds [Abdomen Soft] : soft [Abdomen Tenderness] : non-tender [] : no hepato-splenomegaly [Abdomen Mass (___ Cm)] : no abdominal mass palpated [Abnormal Walk] : normal gait [Nail Clubbing] : no clubbing  or cyanosis of the fingernails [Musculoskeletal - Swelling] : no joint swelling seen [Motor Tone] : muscle strength and tone were normal [FreeTextEntry1] : Lichenoid lesions bilateral shoulders [Affect] : the affect was normal [Mood] : the mood was normal
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
## 2023-01-31 NOTE — ASSESSMENT
[FreeTextEntry1] : Positive rheumatoid factor\par -patient has joint pains that seem more related to degenerative arthritis rather than inflammatory arthritis. She has crepitus on exam indicating osteoarthritis of the knees, and ankle MRI reviewed and showed Achilles tendonitis and effusion. Pain in hands likely related to carpal tunnel syndrome. Discussed symptoms of RA, and she does have a strong family history of this. Will repeat labs RF, CCP and check hepatitis B and C, sjogrens serologies\par

## 2023-02-09 ENCOUNTER — APPOINTMENT (OUTPATIENT)
Dept: ORTHOPEDIC SURGERY | Facility: CLINIC | Age: 53
End: 2023-02-09

## 2023-02-09 NOTE — H&P PST ADULT - TEMPERATURE IN CELSIUS (DEGREES C)
Plan of care reviewed with pt:  -Transferred from PACU around 1805  -Still drowsy but responded well to voice, oriented x4  -Nauseated , no vomiting, nausea relived with Zofran. Due to nausea, hold scheduled Cephalexin, notified night shift nurse  -AAOx4, on 2L of O2 via NC, VS stable  -Right eye with gauze -cotton dressing with scant amount of blood on it, not saturated. Per , ok to give eye drop to right eye while pt awake, night shift nurse notified  -Pt said his pain is control  -Call light in reach, WCTM   36.6

## 2023-02-16 ENCOUNTER — APPOINTMENT (OUTPATIENT)
Dept: ORTHOPEDIC SURGERY | Facility: CLINIC | Age: 53
End: 2023-02-16
Payer: OTHER MISCELLANEOUS

## 2023-02-16 PROCEDURE — 99072 ADDL SUPL MATRL&STAF TM PHE: CPT

## 2023-02-16 PROCEDURE — 99213 OFFICE O/P EST LOW 20 MIN: CPT

## 2023-02-16 NOTE — IMAGING
[de-identified] : Left Ankle: Inspection of the ankle, foot and lower leg is as follows: no abrasions or lacerations, no swelling, no\par erythema, no ecchymosis, no gross deformity and no swelling of calf \par Palpation of the ankle, foot and lower leg is as follows: Tenderness at Achilles tendon, lateral ligaments,\par Achilles tendon insertion and anterior ankle joint line. No tenderness at calf, lateral malleolus, medial malleolus,\par deltoid ligaments, lisfranc's joint, fifth metatarsal tuberosity, calcaneus, posterolateral ankle, posteromedial ankle and\par calcaneocuboid joint. No tenderness over peroneal tendons and posterior tibialis tendon. \par Range of motion of the ankle, foot and lower leg is as follows: Dorsiflexion to 15 degrees. Plantar flexion to\par 30 degrees. Inversion to 20 degrees. Eversion to 15 degrees. Strength testing of the ankle, foot and lower leg is\par as follows: Ankle dorsiflexion strength is 5/5, Ankle plantar flexion strength is 5/5, Ankle inversion strength is 5/5, Ankle\par eversion strength is 5/5, EHL strength is 5/5 and FHL strength is 5/5. Special Testing of the ankle, foot and lower leg\par are as follows: negative anterior drawer at ankle, normal Brito test, negative squeeze test at foot, no pain with\par external rotation of ankle and circumduction without pain. No mid-foot instability. \par Vascular testing of the ankle, foot and lower leg are as follows: Warm and well perfused. \par Sensation testing of the ankle, foot and lower leg are as follows: Sensation present to light touch in all\par distributions. Gait and function is as follows: non-antalgic gait and patient ambulates without assistive device.

## 2023-02-16 NOTE — REASON FOR VISIT
[FreeTextEntry2] : work injury of 6/15/19 to her both ankles and feet\par Still doing therapy for her left shoulder  her nerve tests have shown right carpal tunnel syndrome OT had been denied after the request when in for carpal tunnel surgery

## 2023-02-16 NOTE — ASSESSMENT
[FreeTextEntry1] :   she remains totally disabled unable to work I will see her back in 3  months\par  I did express concern that some of the symptoms present may be permanent\par  she has appointment with Dr. Hancock next week it may be important to know how many visits of therapy were done and to note that she is not not meet any further improvement read regard to the right wrist symptoms and has been doing home therapy program without any improvement

## 2023-02-16 NOTE — HISTORY OF PRESENT ILLNESS
[de-identified] : Kimo is a 52-year-old female seen  for follow up regarding her left ankle pain. she states that on June\par 15th 2019 she had an injury injured her ankles. The left side is still quite painful she has\par had MRIs in 2019 which were reviewed . She states the beginning she did do therapy but was not given very many\par visits she continues to complain of pain is mainly anterior and posterior  ankle She did get an MRI she had not been taking\par anti-inflammatories she continues to complain of pain. not yet able to go back to work. Her MRI had showed mild\par Achilles tendinosis and no other significant abnormalities no signs tarsal tunnel syndrome. She states that she continues\par to feel tight have pain posteriorly even though she has been going to therapy. Her therapist thinks she has made some\par improvements but is still tight. She also states that she is now getting a pain that goes up the medial side of the ankle\par to her knee will cause her knee to give out on her. She is concerned this is a sign of tarsal tunnel syndrome did have\par EMGs done 02/07/2022 her endurance is poor      able to walk about a block  and gets swollen did request therapy last visit\par but is not yet been granted

## 2023-02-21 ENCOUNTER — APPOINTMENT (OUTPATIENT)
Dept: ORTHOPEDIC SURGERY | Facility: CLINIC | Age: 53
End: 2023-02-21
Payer: OTHER MISCELLANEOUS

## 2023-02-21 PROCEDURE — 99213 OFFICE O/P EST LOW 20 MIN: CPT

## 2023-02-21 PROCEDURE — 99072 ADDL SUPL MATRL&STAF TM PHE: CPT

## 2023-02-21 NOTE — ASSESSMENT
[FreeTextEntry1] : Patient comes in for follow-up of her bilateral carpal tunnel and cubital tunnel.  She still having numbness and tingling.  She is having left elbow pain.  The numbness and tingling occurs sometimes on off.  The right side is worse than the left side.  The left elbow hurts her tremendously.\par \par B/L elbow:\par Tender at the ulna nerve\par Pain with flexion\par +tinels at the ulna nerve\par +hyperflexion test\par \par B/L hand: \par Tender volar wrist \par Good finger ROM \par +Tinels \par +Phalens \par +Compression test \par normal sensation\par \par We discussed the recommendation for cubital tunnel surgery- We reviewed that the goal of surgery is to prevent worsening and give the nerve a chance to recover.  If symptoms are constant there is a chance that the nerve is already too badly damaged and no longer has the potential to recover.  In addition the nerve recovers at the rate of an inch per month so recovery of hand function from compression of a nerve at the elbow can take many months to recover.  Motor end plates may degenerate prior to nerve recovery and therefore strength and atrophy may never recover.  Risks, benefits, and alternatives of surgery discussed with patient (and family). Risks including but not limited to infection, blood loss, tendon damage, muscle damage, nerve damage, stiffness, pain, no resolution of symptoms, CRPS, loss of function, potential for secondary surgery, loss of limb or life. Patient understands and was allowed to voice questions or concerns. They agree to surgery\par \par We discussed the recommendation for carpal tunnel surgery- We reviewed that the goal of surgery is to prevent worsening and give the nerve a chance to recover. If symptoms are constant there is a chance that the nerve is already too badly damaged and no longer has the potential to recover.Risks, benefits, and alternatives of surgery discussed with patient (and family).Risks including but not limited to infection, blood loss, tendon damage, muscle damage, nerve damage, stiffness, pain, no resolution of symptoms, CRPS, loss of function, potential for secondary surgery, loss of limb or life.Patient understands and was allowed to voice questions or concerns.They agree to surgery\par \par   Has bilateral carpal tunnel and cubital tunnel.  I believe that we should move forward with the right-hand side.  She the cubital tunnel bothers her as well as the carpal tunnel.  There are plenty of people who unfortunately come out negative for cubital tunnel an EMG NCV however still have it.  She has symptoms.  She also has symptoms of carpal tunnel.  The EMG NCV showed carpal tunnel.  I am recommending right endoscopic carpal tunnel release possible open and cubital tunnel release possible anterior transposition with adipofascial fat flap.  We need authorization for these.  She will follow up with me in another 2 months if we have not got authorization.  \par \par  there is no evidence of steroid injections that help for cubital tunnel symptoms.  The patient has had carpal tunnel for over 2 years.  The right side is not doing well.  I am recommending surgical intervention for the right side.  The patient was doing therapy on her own.  She failed this as a home exercise program.  She was denied authorization for therapy recently.  She has not had any improvements since I have been seeing her and it has been 2 years.  She has not improved.  I believe surgery is her only option at this time.

## 2023-03-16 ENCOUNTER — APPOINTMENT (OUTPATIENT)
Dept: ORTHOPEDIC SURGERY | Facility: CLINIC | Age: 53
End: 2023-03-16
Payer: OTHER MISCELLANEOUS

## 2023-03-16 PROCEDURE — 99072 ADDL SUPL MATRL&STAF TM PHE: CPT

## 2023-03-16 PROCEDURE — 99213 OFFICE O/P EST LOW 20 MIN: CPT

## 2023-03-16 NOTE — HISTORY OF PRESENT ILLNESS
[de-identified] : Pt is s/p left shoulder arthroscopy\par Doing well.\par Pain controlled\par \par Still having scapula spasms\par \par NAD\par Left shoulder\par Incisions healed\par Mild diffuse TTP\par Compartments soft and NT\par ff 170\par er 50\par IR L1\par NVI\par \par s/p left shoulder arthroscopy\par went over the surgery in detail\par cont cons tx\par new pt script\par will refer to PM for trigger pt injections\par not working with temp total disability

## 2023-03-21 ENCOUNTER — APPOINTMENT (OUTPATIENT)
Dept: PAIN MANAGEMENT | Facility: CLINIC | Age: 53
End: 2023-03-21
Payer: OTHER MISCELLANEOUS

## 2023-03-21 VITALS
WEIGHT: 160 LBS | BODY MASS INDEX: 26.66 KG/M2 | DIASTOLIC BLOOD PRESSURE: 89 MMHG | HEIGHT: 65 IN | SYSTOLIC BLOOD PRESSURE: 128 MMHG | HEART RATE: 90 BPM

## 2023-03-21 DIAGNOSIS — Z87.39 PERSONAL HISTORY OF OTHER DISEASES OF THE MUSCULOSKELETAL SYSTEM AND CONNECTIVE TISSUE: ICD-10-CM

## 2023-03-21 PROCEDURE — 99204 OFFICE O/P NEW MOD 45 MIN: CPT

## 2023-03-21 PROCEDURE — 96102W: CUSTOM

## 2023-03-22 PROBLEM — Z87.39 HISTORY OF ARTHRITIS: Status: RESOLVED | Noted: 2023-03-21 | Resolved: 2023-03-22

## 2023-03-22 NOTE — HISTORY OF PRESENT ILLNESS
[FreeTextEntry1] : Ms. Castillo is a 53 year old female furred by referred by Dr. Estrada complaining of left shoulder pain. The pain started after work related injury that occurred in 6-. She works at [employer]. She states that she was getting a object and tripped over something and injured her neck, shoulders, bilateral wrists, bilateral elbows, left ankle.  She is status post left shoulder arthroscopic surgery with complaints of the spasms particularly around the left scapula region.\par \par She continues today with ongoing left shoulder pain. She is currently under the care of orthopedics and is being recommended to undergo trigger point injections to the left shoulder region. She states the pain in the shoulder is severe. She has spasms and stiffness. She states when she is in physical therapy she is unable to due certain motions secondary to the spasms. \par \par The patient has had this pain for 4 years.  Patient describes the pain as moderate to severe. During the last month the pain has been constant with symptoms worsening no typical pattern. Pain described as burning, sharp, shooting, cutting.  Pain is associated with numbness/pins and needles into the left upper extremity.  Patient has weakness in the left upper extremity.  Pain is not changed with lying down, standing, sitting.  Bowel or bladder habits have not changed.\par  \par ACTIVITIES: Patient could walk less than a blocks before the pain starts. The patient sometimes lies down because of pain.  Patient uses no assistive walking device at this time.  Patient has difficulty performing household chores, going to work, doing yardwork or shopping, socializing with friends, participating in recreational activities or exercise at this time.\par \par PRIOR PAIN TREATMENTS:  Moderate relief with surgery, heat treatment and cold treatment.\par \par Prior Pain Medications:  Naproxen, Flexeril.\par

## 2023-03-22 NOTE — DATA REVIEWED
[FreeTextEntry1] : SOAPP: Scored a 0 , low risk.\par  \par [place] REGISTRY: Reviewed .  \par  \par UDS: No data obtained today. \par

## 2023-03-22 NOTE — PHYSICAL EXAM
[Normal Coordination] : normal coordination [Normal DTR UE/LE] : normal DTR UE/LE  [Normal Sensation] : normal sensation [Normal Mood and Affect] : normal mood and affect [Orientated] : orientated [Able to Communicate] : able to communicate [Normal Skin] : normal skin [No Rash] : no rash [No Ulcers] : no ulcers [No Lesions] : no lesions [No obvious lymphadenopathy in areas examined] : no obvious lymphadenopathy in areas examined [Well Developed] : well developed [Well Nourished] : well nourished [Left] : left shoulder [] : motor and sensory intact distally [FreeTextEntry8] : Trigger points in the left cervical and shoulder region noted with a twitch response.  [TWNoteComboBox7] : active forward flexion 170 degrees [de-identified] : active abduction 140 degrees [de-identified] : external rotation 65 degrees

## 2023-03-22 NOTE — ASSESSMENT
[FreeTextEntry1] : 53 year old female presenting with ongoing left Shoulder pain secondary to work related injury.  I will proceed with a left shoulder trigger point injection under ultrasound guidance. I have explained the findings to the patient and all questions have been answered.\par \par Risk, benefits, pros and cons of procedure were explained to the patient using models and diagrams and their questions were answered. \par  \par \par I, Yesica Mckeon, attest that this documentation has been prepared under the direction and in the presence of Provider Stephani Lane MD.\par \par \par Thank you for allowing me to assist in the management of this patient. \par \par \par Best Regards, \par \par \par Stephani Lane M.D., FAAPMR\par \par \par Diplomate, American Board of Physical Medicine and Rehabilitation\par Diplomate, American Board of Pain Medicine \par Diplomate, American Board of Pain Management\par

## 2023-03-30 ENCOUNTER — APPOINTMENT (OUTPATIENT)
Dept: ORTHOPEDIC SURGERY | Facility: CLINIC | Age: 53
End: 2023-03-30

## 2023-04-04 ENCOUNTER — FORM ENCOUNTER (OUTPATIENT)
Age: 53
End: 2023-04-04

## 2023-04-11 ENCOUNTER — APPOINTMENT (OUTPATIENT)
Dept: PAIN MANAGEMENT | Facility: CLINIC | Age: 53
End: 2023-04-11
Payer: OTHER MISCELLANEOUS

## 2023-04-11 PROCEDURE — 20553 NJX 1/MLT TRIGGER POINTS 3/>: CPT

## 2023-04-11 PROCEDURE — 76942 ECHO GUIDE FOR BIOPSY: CPT

## 2023-04-11 NOTE — PROCEDURE
[FreeTextEntry1] : Cervical/shoulder Trigger Point Injection under Ultrasound Guidance\par  [FreeTextEntry3] : Cervical/shoulder Trigger Point Injection under Ultrasound Guidance\par \par \par Date:  2023\par \par Patient: Kimo Castillo\par \par :  1970\par \par \par Preoperative diagnosis:         Trigger points/ Myalgia                      \par \par Postoperative diagnosis:       Same  \par \par                                             \par \par Procedure:                 Cervical/shoulder Trigger point injections with ultrasound guidance.\par \par                                     Trigger point injections, 3 or more muscles:\par \par                                     Ultrasound Guidance:                                    \par \par  \par \par Physician:                  Stephani Lane MD, FAAPMR                                                                                              \par \par \par \par \par Indications for Ultrasound:   Accurate percutaneous needle placement requires image guidance to prevent neuro/vascular injury or intravascular injection, as well as to avoid lung injury.\par \par \par \par \par Medical Necessity:                 Failure of conservative management\par \par  \par \par Indication: The patient presents with a distribution of pain consistent with trigger point pathology.  On palpation and with ultrasound examination there is focal tenderness and a palpable taut band of muscle with restriction of motion.  Noninvasive treatment modalities such as rest, heat/ice and stretching have been ineffective.  The pain has been present for greater than 3 months.\par \par \par \par \par CONSENT: The possible complications including infection, bleeding, nerve damage, pneumothorax (collapsed lung), hospital admission or failure of the procedure; though unusual, are theoretically possible. The patient was educated about the of the procedure and alternative therapies. All questions were answered and the patient freely gave consent to proceed.\par \par  \par \par PROCEDURE NOTE:  After obtaining written consent, the patient was placed on the fluoroscopic table in the prone position. Areas of point tenderness in the muscles were identified and prepped with Hibiclens in a sterile fashion.  Using continuous ultrasound guidance for needle localization, multiple trigger points in one/three or more muscle groups were injected with a 25-gauge 1.5" needle using 5ML 0.50% BUPIVACAINE with 5ML of 2% LIDOCAINE local anesthetic. The direction of needle insertion was kept as parallel to the skin as possible for injections over the thoracic region to avoid directing the needle tip towards the lung. All the needles were removed intact. \par \par  \par \par Ultrasound findings: No calcifications\par \par  \par \par Disposition: I have examined the patient and there are no new physical findings since the original presentation.  Sensory and motor function were intact.\par \par  \par \par Comment: 1st trigger point today, depending on effectiveness and insurance would repeat in 1 week or follow up in office in 1 week. Call if any problems.\par \par \par \par \par  \par \par This document was electronically signed by: \par \par \par \par Stephani Lane MD, FAAPMR\par Diplomate, American Board of Physical Medicine and Rehabilitation\par Diplomate, American Board of Pain Medicine\par \par \par

## 2023-04-28 ENCOUNTER — APPOINTMENT (OUTPATIENT)
Dept: ORTHOPEDIC SURGERY | Facility: CLINIC | Age: 53
End: 2023-04-28
Payer: OTHER MISCELLANEOUS

## 2023-04-28 PROCEDURE — 73562 X-RAY EXAM OF KNEE 3: CPT | Mod: RT

## 2023-04-28 PROCEDURE — 99213 OFFICE O/P EST LOW 20 MIN: CPT

## 2023-04-28 NOTE — IMAGING
[de-identified] : Left Ankle: Inspection of the ankle, foot and lower leg is as follows: no abrasions or lacerations, no swelling, no\par erythema, no ecchymosis, no gross deformity and no swelling of calf \par Palpation of the ankle, foot and lower leg is as follows: Tenderness at Achilles tendon, lateral ligaments,\par Achilles tendon insertion and anterior ankle joint line. No tenderness at calf, lateral malleolus, medial malleolus,\par deltoid ligaments, lisfranc's joint, fifth metatarsal tuberosity, calcaneus, posterolateral ankle, posteromedial ankle and\par calcaneocuboid joint. No tenderness over peroneal tendons and posterior tibialis tendon. \par Range of motion of the ankle, foot and lower leg is as follows: Dorsiflexion to 15 degrees. Plantar flexion to\par 30 degrees. Inversion to 20 degrees. Eversion to 15 degrees. Strength testing of the ankle, foot and lower leg is\par as follows: Ankle dorsiflexion strength is 5/5, Ankle plantar flexion strength is 5/5, Ankle inversion strength is 5/5, Ankle\par eversion strength is 5/5, EHL strength is 5/5 and FHL strength is 5/5. Special Testing of the ankle, foot and lower leg\par are as follows: negative anterior drawer at ankle, normal Brito test, negative squeeze test at foot, no pain with\par external rotation of ankle and circumduction without pain. No mid-foot instability. \par Vascular testing of the ankle, foot and lower leg are as follows: Warm and well perfused. \par Sensation testing of the ankle, foot and lower leg are as follows: Sensation present to light touch in all\par distributions. Gait and function is as follows: non-antalgic gait and patient ambulates without assistive device.\par \par  right knee swelling limited flexion tenderness on the medial side  negative Homans sign extensor mechanism intact\par \par X-rays right knee minimal degenerative change

## 2023-04-28 NOTE — HISTORY OF PRESENT ILLNESS
[de-identified] : Kimo is a 52-year-old female seen  for follow up regarding her left ankle pain. she states that on June\par 15th 2019 she had an injury injured her ankles. The left side is still quite painful she has\par had MRIs in 2019 which were reviewed . She states the beginning she did do therapy but was not given very many\par visits she continues to complain of pain is mainly anterior and posterior  ankle She did get an MRI she had not been taking\par anti-inflammatories she continues to complain of pain. not yet able to go back to work. Her MRI had showed mild\par Achilles tendinosis and no other significant abnormalities no signs tarsal tunnel syndrome. She states that she continues\par to feel tight have pain posteriorly even though she has been going to therapy. Her therapist thinks she has made some\par improvements but is still tight. She also states that she is now getting a pain that goes up the medial side of the ankle\par to her knee will cause her knee to give out on her. She is concerned this is a sign of tarsal tunnel syndrome did have\par EMGs done 02/07/2022 her endurance is poor      able to walk about a block  and gets swollen did request therapy last visit\par but is not yet been granted

## 2023-04-28 NOTE — REASON FOR VISIT
[FreeTextEntry2] : work injury of 6/15/19 to her both knees  ankles and feet  Last week started having sharp pain on the inner side of the right knee has been icing it really has not gotten any better

## 2023-04-28 NOTE — ASSESSMENT
[FreeTextEntry1] :   she remains totally disabled unable to work I will see her back in 2  months\par  I did express concern that some of the symptoms present may be permanent\par  she has appointment with Dr. Hancock  it may be important to know how many visits of therapy were done and to note that she is not not meet any further improvement read regard to the right wrist symptoms and has been doing home therapy program without any improvement\par  she does need follow-up with Dr. Estrada\par  I recommended an MRI of the right knee to rule out internal derangement I do believe the right knee pain his consequential to the injury to her ankle  do to wait altered gait pattern  I recommended Ace wrap ice weight bear as tolerated\par  we could consider a cortisone injection I expressed my concern the much of all of the remaining symptoms are permanent\par

## 2023-05-02 ENCOUNTER — APPOINTMENT (OUTPATIENT)
Dept: ORTHOPEDIC SURGERY | Facility: CLINIC | Age: 53
End: 2023-05-02
Payer: OTHER MISCELLANEOUS

## 2023-05-02 PROCEDURE — 99214 OFFICE O/P EST MOD 30 MIN: CPT

## 2023-05-02 NOTE — REASON FOR VISIT
[FreeTextEntry2] : Bilateral carpal tunnel syndrome \par Cubital tunnel syndrome of both upper extremities

## 2023-05-02 NOTE — WORK
[Total (100%)] : total (100%) [Does not reveal pre-existing condition(s) that may affect treatment/prognosis] : does not reveal pre-existing condition(s) that may affect treatment/prognosis [Cannot return to work because ________] : cannot return to work because [unfilled] [I provided the services listed above] :  I provided the services listed above. [FreeTextEntry3] : lg

## 2023-05-02 NOTE — ASSESSMENT
[FreeTextEntry1] : Patient comes in for follow-up of her bilateral carpal tunnel and cubital tunnel.  She still having numbness and tingling.  She is having left elbow pain.  She is also having left wrist pain.  The numbness and tingling occurs sometimes on off.  The right side is worse than the left side.  The left elbow hurts her tremendously.\par \par B/L elbow:\par Tender at the ulna nerve\par Pain with flexion\par +tinels at the ulna nerve\par +hyperflexion test\par \par B/L hand: \par Tender volar wrist \par Good finger ROM \par +Tinels \par +Phalens \par +Compression test \par normal sensation\par \par We discussed the recommendation for cubital tunnel surgery- We reviewed that the goal of surgery is to prevent worsening and give the nerve a chance to recover.  If symptoms are constant there is a chance that the nerve is already too badly damaged and no longer has the potential to recover.  In addition the nerve recovers at the rate of an inch per month so recovery of hand function from compression of a nerve at the elbow can take many months to recover.  Motor end plates may degenerate prior to nerve recovery and therefore strength and atrophy may never recover.  Risks, benefits, and alternatives of surgery discussed with patient (and family). Risks including but not limited to infection, blood loss, tendon damage, muscle damage, nerve damage, stiffness, pain, no resolution of symptoms, CRPS, loss of function, potential for secondary surgery, loss of limb or life. Patient understands and was allowed to voice questions or concerns. They agree to surgery\par \par We discussed the recommendation for carpal tunnel surgery- We reviewed that the goal of surgery is to prevent worsening and give the nerve a chance to recover. If symptoms are constant there is a chance that the nerve is already too badly damaged and no longer has the potential to recover.Risks, benefits, and alternatives of surgery discussed with patient (and family).Risks including but not limited to infection, blood loss, tendon damage, muscle damage, nerve damage, stiffness, pain, no resolution of symptoms, CRPS, loss of function, potential for secondary surgery, loss of limb or life.Patient understands and was allowed to voice questions or concerns.They agree to surgery\par \par   Has bilateral carpal tunnel and cubital tunnel.  I believe that we should move forward with the right-hand side.  She the cubital tunnel bothers her as well as the carpal tunnel.  There are plenty of people who unfortunately come out negative for cubital tunnel an EMG NCV however still have it.  She has symptoms.  She also has symptoms of carpal tunnel.  The EMG NCV showed carpal tunnel.  I am recommending right endoscopic carpal tunnel release possible open and cubital tunnel release possible anterior transposition with adipofascial fat flap.  We need authorization for these.  She will follow up with me in another 2 months if we have not got authorization.  \par \par  there is no evidence of steroid injections that help for cubital tunnel symptoms.  The patient has had carpal tunnel for over 2 years.  The right side is not doing well.  I am recommending surgical intervention for the right side.  The patient was doing therapy on her own.  She failed this as a home exercise program.  She was denied authorization for therapy recently.  She has not had any improvements since I have been seeing her and it has been 2 years.  She has not improved.  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  We have discussed surgery in the past. we reviewed the anatomy, pathology, and treatment options for TFCC tears. We discussed that most of the TFCC is avascular and tears are slow to heal if at all but that surgical results are not guaranteed due to the poor healing capacity of the structure.  Even at surgery, most tears cannot be repaired, but are merely debrided.  We discussed the use of nsaids, bracing, rest, local modalities, injection and surgery in the treatment of TFCC tears. The patient has failed all conservative intervention.  She most likely is interested in surgical intervention.

## 2023-05-03 ENCOUNTER — APPOINTMENT (OUTPATIENT)
Dept: PAIN MANAGEMENT | Facility: CLINIC | Age: 53
End: 2023-05-03
Payer: OTHER MISCELLANEOUS

## 2023-05-03 VITALS — BODY MASS INDEX: 26.66 KG/M2 | HEIGHT: 65 IN | WEIGHT: 160 LBS

## 2023-05-03 PROCEDURE — 99213 OFFICE O/P EST LOW 20 MIN: CPT | Mod: ACP

## 2023-05-03 NOTE — HISTORY OF PRESENT ILLNESS
[FreeTextEntry1] : Ms. Castillo is a 53 year old female furred by referred by Dr. Estrada complaining of left shoulder pain. The pain started after work related injury that occurred in 6-. She works at [employer]. She states that she was getting a object and tripped over something and injured her neck, shoulders, bilateral wrists, bilateral elbows, left ankle.  She is status post left shoulder arthroscopic surgery with complaints of the spasms particularly around the left scapula region.\par \par She continues today with ongoing left shoulder pain. She is currently under the care of orthopedics and is being recommended to undergo trigger point injections to the left shoulder region. She states the pain in the shoulder is severe. She has spasms and stiffness. She states when she is in physical therapy she is unable to due certain motions secondary to the spasms. \par \par The patient has had this pain for 4 years.  Patient describes the pain as moderate to severe. During the last month the pain has been constant with symptoms worsening no typical pattern. Pain described as burning, sharp, shooting, cutting.  Pain is associated with numbness/pins and needles into the left upper extremity.  Patient has weakness in the left upper extremity.  Pain is not changed with lying down, standing, sitting.  Bowel or bladder habits have not changed.\par  \par ACTIVITIES: Patient could walk less than a blocks before the pain starts. The patient sometimes lies down because of pain.  Patient uses no assistive walking device at this time.  Patient has difficulty performing household chores, going to work, doing yardwork or shopping, socializing with friends, participating in recreational activities or exercise at this time.\par \par PRIOR PAIN TREATMENTS:  Moderate relief with surgery, heat treatment and cold treatment.\par \par Prior Pain Medications:  Naproxen, Flexeril.\par \par Today: I had the pleasure of seeing MEGHAN CASTILLO in the office today. Previous history and physical exam findings have been reviewed. \par \par She is currently under our care for left shoulder pain which she is receiving active care for. She is s/p cervical/shoulder trigger point injections on 4/11/23 with excellent relief. She states the injections have provided her with over 50% relief. Her stiffness and spasms have subsided. She continues to experience some difficulty when trying to achieve a full ROM with her left shoulder. We discussed undergoing extensive PT sessions and she is agreeable. She is utilizing Flexeril which uses PRN for stiffness and spasms which is prescribed by Dr. Neely. \par \par

## 2023-05-03 NOTE — ASSESSMENT
[FreeTextEntry1] : 53 year old female presenting with ongoing left Shoulder pain secondary to work related injury.   She is s/p cervical/shoulder trigger point injections on 4/11/23 with excellent relief. She has agreed to continue with PT for her left shoulder and a new script will be given today. She is utilizing Flexeril PRN for stiffness and spasms. She will f/u PRN. She is aware she can contact the office with any questions.\par \par Santiago Whittaker PA-C\par Stephani Lane MD\par \par

## 2023-05-03 NOTE — PHYSICAL EXAM
[Normal Coordination] : normal coordination [Normal DTR UE/LE] : normal DTR UE/LE  [Normal Sensation] : normal sensation [Normal Mood and Affect] : normal mood and affect [Orientated] : orientated [Able to Communicate] : able to communicate [Normal Skin] : normal skin [No Rash] : no rash [No Ulcers] : no ulcers [No Lesions] : no lesions [No obvious lymphadenopathy in areas examined] : no obvious lymphadenopathy in areas examined [Well Developed] : well developed [Well Nourished] : well nourished [Left] : left shoulder [] : no scapular winging [FreeTextEntry8] : Trigger points in the left cervical and shoulder region noted with a twitch response.  [TWNoteComboBox7] : active forward flexion 170 degrees [de-identified] : active abduction 140 degrees [de-identified] : external rotation 65 degrees

## 2023-06-05 ENCOUNTER — APPOINTMENT (OUTPATIENT)
Dept: ORTHOPEDIC SURGERY | Facility: CLINIC | Age: 53
End: 2023-06-05
Payer: OTHER MISCELLANEOUS

## 2023-06-05 PROCEDURE — 99455 WORK RELATED DISABILITY EXAM: CPT

## 2023-06-05 NOTE — IMAGING
[de-identified] :  left ankle:. \par  Dorsiflexion to 15 degrees. Plantar flexion to  30°\par 30 degrees.  mild loss of inversion eversion. \par  Warm and well perfused. \par  Sensation normal    non-antalgic gait and patient ambulates without assistive device.  negative anterior drawer negative Brito\par  right ankle   plantar flexion 30° dorsiflexion 15° mild limits in inversion eversion negative anterior drawer negative Brito test\par \par  right knee swelling tenderness on the medial side  negative Homans sign extensor mechanism intact\par  lacks 5° extension 120° flexion\par  left knee mild swelling medial joint line tenderness patellofemoral crepitus\par  5° lack of  full extension 120° flexion\par \par X-rays right knee minimal degenerative change\par  MRI right knee 05/03/2023 no meniscal tears  osteoarthritis with chondral wear

## 2023-06-05 NOTE — ASSESSMENT
[FreeTextEntry1] :   she remains totally disabled unable to work \par  I did express concern that some of the symptoms present may be permanent\par  she has appointment with Dr. Hancock   and Dr. Estrada\par  \par  I do believe the right knee pain his consequential to the injury to her ankle  do to an altered gait pattern  I recommended Ace wrap ice weight bear as tolerated\par  at this time I do believe she has reached maximum medical improvement and the injuries to her knees ankles/feet are amenable for schedule loss use using [place] state worker's compensation guidelines\par left knee        15 % SLU\par right knee       15 % SLU\par left ankle        22 1/2 % [facility]\par right ankle       22 1/2% [facility]\par  all measurements were taken 3 times with the use of a goniometer return in 4 months  she is not working\par

## 2023-06-05 NOTE — HISTORY OF PRESENT ILLNESS
[de-identified] : Kimo is a 52-year-old female seen  for follow up regarding her left ankle pain. she states that on June\par 15th 2019 she had an injury injured her ankles. The left side is still quite painful she has\par had MRIs in 2019 which were reviewed . She states the beginning she did do therapy but was not given very many\par visits she continues to complain of pain is mainly anterior and posterior  ankle She did get an MRI she had not been taking\par anti-inflammatories she continues to complain of pain. not yet able to go back to work. Her MRI had showed mild\par Achilles tendinosis and no other significant abnormalities no signs tarsal tunnel syndrome. She states that she continues\par to feel tight have pain posteriorly even though she has been going to therapy. Her therapist thinks she has made some\par improvements but is still tight. She also states that she is now getting a pain that goes up the medial side of the ankle\par to her knee will cause her knee to give out on her. She is concerned this is a sign of tarsal tunnel syndrome did have\par EMGs done 02/07/2022 her endurance is poor      able to walk about a block  and gets swollen did request therapy last visit\par but is not yet been granted

## 2023-06-05 NOTE — REASON FOR VISIT
[FreeTextEntry2] : WC DOI 06/15/2019 bilateral knees, bilateral ankles, bilateral feet.  pain is still more significant in the right knee did have the MRI done 05/03/2023 no tears  to the meniscus\par  has been doing therapy at home on no medication

## 2023-06-06 ENCOUNTER — FORM ENCOUNTER (OUTPATIENT)
Age: 53
End: 2023-06-06

## 2023-06-06 ENCOUNTER — APPOINTMENT (OUTPATIENT)
Dept: ORTHOPEDIC SURGERY | Facility: CLINIC | Age: 53
End: 2023-06-06
Payer: OTHER MISCELLANEOUS

## 2023-06-06 ENCOUNTER — APPOINTMENT (OUTPATIENT)
Dept: ORTHOPEDIC SURGERY | Facility: CLINIC | Age: 53
End: 2023-06-06

## 2023-06-06 PROCEDURE — 99455 WORK RELATED DISABILITY EXAM: CPT

## 2023-06-06 PROCEDURE — 99245 OFF/OP CONSLTJ NEW/EST HI 55: CPT

## 2023-06-06 NOTE — HISTORY OF PRESENT ILLNESS
[de-identified] : Pt is s/p left shoulder arthroscopy\par Doing well.\par Pain controlled\par \par Still having scapula spasms\par \par NAD\par Left shoulder\par Incisions healed\par Mild diffuse TTP\par Compartments soft and NT\par ff 170\par abd 120\par er 50\par IR L1\par NVI\par \par s/p left shoulder arthroscopy\par went over the surgery in detail\par this is a SLU and WC case

## 2023-06-06 NOTE — WORK
[Total (100%)] : total (100%) [Left] : left [FreeTextEntry7] : shoulder [FreeTextEntry8] : ff 170\par abd 120\par er 50\par IR L1 [de-identified] : weakness to RC strength [FreeTextEntry5] : 35 [de-identified] : measured 3 times, with goniometer, all the same

## 2023-06-07 NOTE — HISTORY OF PRESENT ILLNESS
[de-identified] : Pt is s/p left shoulder arthroscopy\par Doing well.\par Pain controlled\par \par Still having scapula spasms\par \par NAD\par Left shoulder\par Incisions healed\par Mild diffuse TTP\par Compartments soft and NT\par ff 170\par abd 120\par er 50\par IR L1\par NVI\par \par s/p left shoulder arthroscopy\par went over the surgery in detail\par this is a SLU and WC case

## 2023-06-07 NOTE — WORK
[Total (100%)] : total (100%) [Left] : left [FreeTextEntry7] : shoulder [FreeTextEntry8] : ff 170\par abd 120\par er 50\par IR L1 [de-identified] : weakness to RC strength [FreeTextEntry5] : 35 [de-identified] : measured 3 times, with goniometer, all the same

## 2023-06-09 ENCOUNTER — APPOINTMENT (OUTPATIENT)
Dept: ORTHOPEDIC SURGERY | Facility: CLINIC | Age: 53
End: 2023-06-09
Payer: OTHER MISCELLANEOUS

## 2023-06-09 DIAGNOSIS — G56.03 CARPAL TUNNEL SYNDROM,BILATERAL UPPER LIMBS: ICD-10-CM

## 2023-06-09 PROCEDURE — 99243 OFF/OP CNSLTJ NEW/EST LOW 30: CPT

## 2023-06-13 PROBLEM — G56.03 BILATERAL CARPAL TUNNEL SYNDROME: Status: ACTIVE | Noted: 2022-10-31

## 2023-06-13 NOTE — WORK
[Has the patient reached Maximum Medical Improvement? If yes, indicate date___] : Yes, on [unfilled] [Left] : left [Right] : right [FreeTextEntry7] : left : elbow, wrist and hand/ right elbow and hand [FreeTextEntry5] : left 50, right 50 [de-identified] : Left elbow 20%, left hand 20%, left wrist 10%\par Right elbow 25%, right hand 25%

## 2023-06-13 NOTE — ASSESSMENT
[FreeTextEntry1] : Patient comes in schedule loss of use. She is still having pain in her wrist. She is right hand dominant.  She still complains of numbness and tingling in both of her hands.  The right side is worse than the left side.  She has surgery on the left side.  Her left wrist bothers her still significantly.\par \par Left elbow: Tender palpation over ulnar nerve, negative Tinel's, full range of motion of elbow\par \par Left Wrist \par 50  degrees flexion\par 50 degrees extension \par 30 degrees ulnar deviation \par 10 degrees radial deviation\par 70   supination\par 70  pronation \par +Tinels \par  durkans\par Decreased sensation median nerve distribution and mildly in ulnar nerve distribution\par \par dynanometer:\par 10 lbs of force \par 7 lbs of force \par 5  lbs of force\par \par Right elbow: Tender palpation over ulnar nerve, positive Tinel's, positive positive hyperflexion test positive hyperflexion compression test\par \par Right Wrist \par 90 degrees flexion\par 80 degrees extension \par 40 degrees ulnar deviation \par 20 degrees radial deviation\par full  supination\par full  pronation \par \par +Tinels \par  durkans \par Decreased sensation median and ulnar nerve distribution\par \par dyanometeR: \par 10 lbs of force \par 7 lbs of force\par 5 lbs of force\par \par Patient still has significant pain in the left wrist.  She has lost range of motion.  She has a TFCC tear.  As for the left elbow and hand she still has some symptoms of cubital tunnel and carpal tunnel even though she has had surgery so each 1 is giving her a problem as well.  As for the right side, she still has cubital tunnel and carpal tunnel which are still bothering her significantly.

## 2023-06-16 ENCOUNTER — APPOINTMENT (OUTPATIENT)
Dept: ORTHOPEDIC SURGERY | Facility: CLINIC | Age: 53
End: 2023-06-16

## 2023-06-22 ENCOUNTER — FORM ENCOUNTER (OUTPATIENT)
Age: 53
End: 2023-06-22

## 2023-06-27 ENCOUNTER — FORM ENCOUNTER (OUTPATIENT)
Age: 53
End: 2023-06-27

## 2023-07-14 ENCOUNTER — APPOINTMENT (OUTPATIENT)
Dept: ORTHOPEDIC SURGERY | Facility: CLINIC | Age: 53
End: 2023-07-14

## 2023-09-27 ENCOUNTER — APPOINTMENT (OUTPATIENT)
Dept: ORTHOPEDIC SURGERY | Facility: CLINIC | Age: 53
End: 2023-09-27
Payer: OTHER MISCELLANEOUS

## 2023-09-27 PROCEDURE — 99213 OFFICE O/P EST LOW 20 MIN: CPT

## 2023-10-12 ENCOUNTER — APPOINTMENT (OUTPATIENT)
Dept: ORTHOPEDIC SURGERY | Facility: CLINIC | Age: 53
End: 2023-10-12

## 2023-12-15 ENCOUNTER — NON-APPOINTMENT (OUTPATIENT)
Age: 53
End: 2023-12-15

## 2023-12-21 ENCOUNTER — APPOINTMENT (OUTPATIENT)
Dept: ORTHOPEDIC SURGERY | Facility: CLINIC | Age: 53
End: 2023-12-21
Payer: OTHER MISCELLANEOUS

## 2023-12-21 PROCEDURE — 99075 MEDICAL TESTIMONY: CPT

## 2024-01-16 ENCOUNTER — APPOINTMENT (OUTPATIENT)
Dept: ORTHOPEDIC SURGERY | Facility: CLINIC | Age: 54
End: 2024-01-16

## 2024-01-18 ENCOUNTER — APPOINTMENT (OUTPATIENT)
Dept: ORTHOPEDIC SURGERY | Facility: CLINIC | Age: 54
End: 2024-01-18
Payer: OTHER MISCELLANEOUS

## 2024-01-18 PROCEDURE — 99075 MEDICAL TESTIMONY: CPT

## 2024-01-22 ENCOUNTER — TRANSCRIPTION ENCOUNTER (OUTPATIENT)
Age: 54
End: 2024-01-22

## 2024-01-22 ENCOUNTER — APPOINTMENT (OUTPATIENT)
Dept: ORTHOPEDIC SURGERY | Facility: CLINIC | Age: 54
End: 2024-01-22
Payer: OTHER MISCELLANEOUS

## 2024-01-22 PROCEDURE — 99075 MEDICAL TESTIMONY: CPT

## 2024-01-24 ENCOUNTER — APPOINTMENT (OUTPATIENT)
Dept: ORTHOPEDIC SURGERY | Facility: CLINIC | Age: 54
End: 2024-01-24

## 2024-03-18 ENCOUNTER — APPOINTMENT (OUTPATIENT)
Dept: PAIN MANAGEMENT | Facility: CLINIC | Age: 54
End: 2024-03-18
Payer: OTHER MISCELLANEOUS

## 2024-03-18 PROCEDURE — 99214 OFFICE O/P EST MOD 30 MIN: CPT | Mod: ACP

## 2024-03-18 NOTE — PHYSICAL EXAM
[Normal Coordination] : normal coordination [Normal Sensation] : normal sensation [Normal DTR UE/LE] : normal DTR UE/LE  [Normal Mood and Affect] : normal mood and affect [Able to Communicate] : able to communicate [Oriented] : oriented [Normal Skin] : normal skin [No Rash] : no rash [No Ulcers] : no ulcers [No Lesions] : no lesions [No obvious lymphadenopathy in areas examined] : no obvious lymphadenopathy in areas examined [Well Developed] : well developed [Well Nourished] : well nourished [Extension] : extension [Flexion] : flexion [Rotation to right] : rotation to right [de-identified] : left lateral flexion 20 degrees [TWNoteComboBox6] : right lateral rotation 25 degrees [de-identified] : right lateral flexion 20 degrees [Left] : left shoulder [Sitting] : sitting [Moderate] : moderate [] : motor and sensory intact distally [FreeTextEntry8] : Trigger points in the left cervical and shoulder region noted with a twitch response.  [de-identified] : active abduction 115 degrees [TWNoteComboBox7] : active forward flexion 140 degrees [de-identified] : external rotation 45 degrees

## 2024-03-18 NOTE — HISTORY OF PRESENT ILLNESS
[FreeTextEntry1] : Ms. Castillo is a 54 year old female furred by referred by Dr. Estrada complaining of left shoulder pain. The pain started after work related injury that occurred in 6-. She works at [employer]. She states that she was getting a object and tripped over something and injured her neck, shoulders, bilateral wrists, bilateral elbows, left ankle.  She is status post left shoulder arthroscopic surgery with complaints of the spasms particularly around the left scapula region.  She continues today with ongoing left shoulder pain. She is currently under the care of orthopedics and is being recommended to undergo trigger point injections to the left shoulder region. She states the pain in the shoulder is severe. She has spasms and stiffness. She states when she is in physical therapy she is unable to due certain motions secondary to the spasms.   The patient has had this pain for 4 years.  Patient describes the pain as moderate to severe. During the last month the pain has been constant with symptoms worsening no typical pattern. Pain described as burning, sharp, shooting, cutting.  Pain is associated with numbness/pins and needles into the left upper extremity.  Patient has weakness in the left upper extremity.  Pain is not changed with lying down, standing, sitting.  Bowel or bladder habits have not changed.   ACTIVITIES: Patient could walk less than a blocks before the pain starts. The patient sometimes lies down because of pain.  Patient uses no assistive walking device at this time.  Patient has difficulty performing household chores, going to work, doing yardwork or shopping, socializing with friends, participating in recreational activities or exercise at this time.  PRIOR PAIN TREATMENTS:  Moderate relief with surgery, heat treatment and cold treatment.  Prior Pain Medications:  Naproxen, Flexeril.  TODAY: Last seen on 05/03/2023 and today presents with an acute exacerbation of the left shoulder/neck pain for the past two weeks, not getting better, painfully decreased Cervical ROM and moderate to severe muscle spasm in the Left paracervical and left Trapezius muscles with multiple palpable trigger points on palpation. There also painfully decreased Left shoulder ROM and tenderness on palpation over left Deltoid muscle. Patient rates her pain as 8/10 in severity most of the time. Patient reports constant Headaches and poor sleep at night due to the shoulder/neck pain. We will submit for another set of Trigger Points today.  She is currently under our care for left shoulder pain which she is receiving active care for. She is s/p cervical/shoulder trigger point injections on 04/11/2023 with over 80% of pain relief and improvement in ROM and functioning for over 6 month. She reports that after previous Trigger Point injections her stiffness and spasms have subsided. She continues to experience some difficulty when trying to achieve a full ROM with her left shoulder. She is utilizing Flexeril which uses PRN for stiffness and spasms which is prescribed by Dr. Neely.

## 2024-03-18 NOTE — DISCUSSION/SUMMARY
[Medication Risks Reviewed] : Medication risks reviewed [de-identified] : 54 year old female presenting with ongoing left Shoulder/Neck pain secondary to work related injury.   She is s/p cervical/shoulder trigger point injections on 4/11/23 with over 80% of sustained pain relief and improvement of [id number] for over 6 month.  Due to recent exacerbation of pain and moderate to severe muscle spasm in the Left Trapezius, Deltoid and paracervical muscles with multiple palpable trigger points, we will submit for another set of Trigger Point injections.  TRIGGER POINT PARAGRAPH: Patient has documented myofascial spasms and trigger points in the muscle. Patient has trialed rehab (Home exercise, physical therapy or chiropractic care) and medications. We will schedule a series of 1-3 [muscle group] trigger points over 3-6 weeks. If 50% or greater relief for 6-8 weeks another can be repeated vs Botox.  Risk, benefits, pros and cons of procedure were explained to the patient using models and diagrams and their questions were answered.  Total clinician time spent today on the patient is 30 minutes including preparing to see the patient, obtaining and/or reviewing and confirming history, performing medically necessary and appropriate examination, counseling and educating the patient and/or family, documenting clinical information in the EHR and communicating and/or referring to other healthcare professionals.  DILAN Pete MD

## 2024-03-26 ENCOUNTER — APPOINTMENT (OUTPATIENT)
Dept: PAIN MANAGEMENT | Facility: CLINIC | Age: 54
End: 2024-03-26
Payer: COMMERCIAL

## 2024-03-26 PROCEDURE — 20552 NJX 1/MLT TRIGGER POINT 1/2: CPT

## 2024-03-26 NOTE — DISCUSSION/SUMMARY
[Medication Risks Reviewed] : Medication risks reviewed [de-identified] : 54 year old female presenting with ongoing left Shoulder/Neck pain secondary to work related injury.   We will perform Left Cervical Trigger Point injections today.  TRIGGER POINT PARAGRAPH: Patient has documented myofascial spasms and trigger points in the muscle. Patient has trialed rehab (Home exercise, physical therapy or chiropractic care) and medications. We will schedule a series of 1-3 [muscle group] trigger points over 3-6 weeks. If 50% or greater relief for 6-8 weeks another can be repeated vs Botox.  Risk, benefits, pros and cons of procedure were explained to the patient using models and diagrams and their questions were answered.  Total clinician time spent today on the patient is 15 minutes including preparing to see the patient, obtaining and/or reviewing and confirming history, performing medically necessary and appropriate examination, counseling and educating the patient and/or family, documenting clinical information in the EHR and communicating and/or referring to other healthcare professionals.  Rafa Jackson PA-C

## 2024-03-26 NOTE — PHYSICAL EXAM
[Normal Coordination] : normal coordination [Normal DTR UE/LE] : normal DTR UE/LE  [Normal Sensation] : normal sensation [Normal Mood and Affect] : normal mood and affect [Oriented] : oriented [Able to Communicate] : able to communicate [Normal Skin] : normal skin [No Rash] : no rash [No Ulcers] : no ulcers [No Lesions] : no lesions [Well Developed] : well developed [No obvious lymphadenopathy in areas examined] : no obvious lymphadenopathy in areas examined [Well Nourished] : well nourished [Flexion] : flexion [Extension] : extension [Rotation to right] : rotation to right [de-identified] : left lateral flexion 20 degrees [de-identified] : right lateral flexion 20 degrees [TWNoteComboBox6] : right lateral rotation 25 degrees [Left] : left shoulder [Sitting] : sitting [Moderate] : moderate [] : motor and sensory intact distally [FreeTextEntry8] : Trigger points in the left cervical and shoulder region noted with a twitch response.  [TWNoteComboBox7] : active forward flexion 140 degrees [de-identified] : active abduction 115 degrees [de-identified] : external rotation 45 degrees

## 2024-03-26 NOTE — HISTORY OF PRESENT ILLNESS
[FreeTextEntry1] : Ms. Castillo is a 54 year old female furred by referred by Dr. Estrada complaining of left shoulder pain. The pain started after work related injury that occurred in 6-. She works at [employer]. She states that she was getting a object and tripped over something and injured her neck, shoulders, bilateral wrists, bilateral elbows, left ankle.  She is status post left shoulder arthroscopic surgery with complaints of the spasms particularly around the left scapula region.  She continues today with ongoing left shoulder pain. She is currently under the care of orthopedics and is being recommended to undergo trigger point injections to the left shoulder region. She states the pain in the shoulder is severe. She has spasms and stiffness. She states when she is in physical therapy she is unable to due certain motions secondary to the spasms.   The patient has had this pain for 4 years.  Patient describes the pain as moderate to severe. During the last month the pain has been constant with symptoms worsening no typical pattern. Pain described as burning, sharp, shooting, cutting.  Pain is associated with numbness/pins and needles into the left upper extremity.  Patient has weakness in the left upper extremity.  Pain is not changed with lying down, standing, sitting.  Bowel or bladder habits have not changed.   ACTIVITIES: Patient could walk less than a blocks before the pain starts. The patient sometimes lies down because of pain.  Patient uses no assistive walking device at this time.  Patient has difficulty performing household chores, going to work, doing yardwork or shopping, socializing with friends, participating in recreational activities or exercise at this time.  PRIOR PAIN TREATMENTS:  Moderate relief with surgery, heat treatment and cold treatment.  Prior Pain Medications:  Naproxen, Flexeril.  TODAY: Last seen on 03/18/2024 and today presents for Left Cervical Trigger Point injections.

## 2024-03-26 NOTE — PROCEDURE
[Trigger point 1-2 muscle groups] : trigger point 1-2 muscle groups [Left] : of the left [Cervical paraspinal muscle] : cervical paraspinal muscle [Trapezius muscle] : trapezius muscle [Pain] : pain [Inflammation] : inflammation [Alcohol] : alcohol [Ethyl Chloride sprayed topically] : ethyl chloride sprayed topically [___ cc    1%] : Lidocaine ~Vcc of 1%  [___ cc    60mg] : Ketorolac (Toradol) ~Vcc of 60 mg  [] : Patient tolerated procedure well [Call if redness, pain or fever occur] : call if redness, pain or fever occur [Apply ice for 15min out of every hour for the next 12-24 hours as tolerated] : apply ice for 15 minutes out of every hour for the next 12-24 hours as tolerated [Previous OTC use and PT nontherapeutic] : patient has tried OTC's including aspirin, Ibuprofen, Aleve, etc or prescription NSAIDS, and/or exercises at home and/or physical therapy without satisfactory response [Risks, benefits, alternatives discussed / Verbal consent obtained] : the risks benefits, and alternatives have been discussed, and verbal consent was obtained

## 2024-04-23 ENCOUNTER — APPOINTMENT (OUTPATIENT)
Dept: PAIN MANAGEMENT | Facility: CLINIC | Age: 54
End: 2024-04-23
Payer: OTHER MISCELLANEOUS

## 2024-04-23 PROCEDURE — 99214 OFFICE O/P EST MOD 30 MIN: CPT | Mod: ACP

## 2024-04-23 RX ORDER — CYCLOBENZAPRINE HYDROCHLORIDE 10 MG/1
10 TABLET, FILM COATED ORAL
Qty: 30 | Refills: 3 | Status: ACTIVE | COMMUNITY
Start: 2023-04-06 | End: 1900-01-01

## 2024-04-23 NOTE — HISTORY OF PRESENT ILLNESS
[FreeTextEntry1] : Ms. Castillo is a 54 year old female furred by referred by Dr. Estrada complaining of left shoulder pain. The pain started after work related injury that occurred in 6-. She works at [employer]. She states that she was getting a object and tripped over something and injured her neck, shoulders, bilateral wrists, bilateral elbows, left ankle.  She is status post left shoulder arthroscopic surgery with complaints of the spasms particularly around the left scapula region.  She continues today with ongoing left shoulder pain. She is currently under the care of orthopedics and is being recommended to undergo trigger point injections to the left shoulder region. She states the pain in the shoulder is severe. She has spasms and stiffness. She states when she is in physical therapy she is unable to due certain motions secondary to the spasms.   The patient has had this pain for 4 years.  Patient describes the pain as moderate to severe. During the last month the pain has been constant with symptoms worsening no typical pattern. Pain described as burning, sharp, shooting, cutting.  Pain is associated with numbness/pins and needles into the left upper extremity.  Patient has weakness in the left upper extremity.  Pain is not changed with lying down, standing, sitting.  Bowel or bladder habits have not changed.   ACTIVITIES: Patient could walk less than a blocks before the pain starts. The patient sometimes lies down because of pain.  Patient uses no assistive walking device at this time.  Patient has difficulty performing household chores, going to work, doing yardwork or shopping, socializing with friends, participating in recreational activities or exercise at this time.  PRIOR PAIN TREATMENTS:  Moderate relief with surgery, heat treatment and cold treatment.  Prior Pain Medications:  Naproxen, Flexeril.  TODAY: Last seen on 03/18/2024 and since then she underwent Left Cervical TPIs on 03/26/2024  and today 5reports at least 75% pain relief for over three weeks and improvement of functioning. Today she reports that her pain started coming back. There is again moderate to severe muscle spasm in the Left paracervical and left Trapezius muscles with multiple palpable trigger points on palpation. There also painfully decreased Left shoulder ROM and tenderness on palpation over left Deltoid muscle. Patient rates her pain as 7/10 in severity most of the time. Patient reports constant Headaches and poor sleep at night due to the shoulder/neck pain. We will submit for another set of Trigger Points(x3)  today.  She is currently under our care for left shoulder pain which she is receiving active care for. She is s/p cervical/shoulder trigger point injections on 04/11/2023 with over 80% of pain relief and improvement in ROM and functioning for over 6 month. She reports that after previous Trigger Point injections her stiffness and spasms have subsided. She continues to experience some difficulty when trying to achieve a full ROM with her left shoulder. She is utilizing Flexeril which uses PRN for stiffness and spasms which is prescribed by Dr. Neely.

## 2024-04-23 NOTE — PHYSICAL EXAM
[Normal Coordination] : normal coordination [Normal DTR UE/LE] : normal DTR UE/LE  [Normal Sensation] : normal sensation [Normal Mood and Affect] : normal mood and affect [Oriented] : oriented [Able to Communicate] : able to communicate [Normal Skin] : normal skin [No Rash] : no rash [No Ulcers] : no ulcers [No Lesions] : no lesions [No obvious lymphadenopathy in areas examined] : no obvious lymphadenopathy in areas examined [Well Developed] : well developed [Well Nourished] : well nourished [Flexion] : flexion [Extension] : extension [Rotation to right] : rotation to right [de-identified] : left lateral flexion 20 degrees [de-identified] : right lateral flexion 20 degrees [TWNoteComboBox6] : right lateral rotation 25 degrees [Left] : left shoulder [Sitting] : sitting [Moderate] : moderate [] : motor and sensory intact distally [FreeTextEntry8] : Trigger points in the left cervical and shoulder region noted with a twitch response.  [TWNoteComboBox7] : active forward flexion 140 degrees [de-identified] : active abduction 115 degrees [de-identified] : external rotation 45 degrees

## 2024-04-23 NOTE — DISCUSSION/SUMMARY
[Medication Risks Reviewed] : Medication risks reviewed [de-identified] : 54 year old female presenting with ongoing left Shoulder/Neck pain secondary to work related injury.   She is s/p cervical/shoulder trigger point injections on 03/26/2024 with over 75% of sustained pain relief and improvement of [id number] for over 3 weeks.  Due to recent exacerbation of pain and moderate to severe muscle spasm in the Left Trapezius, Deltoid and paracervical muscles with multiple palpable trigger points, we will submit for another set of Trigger Point injections.  TRIGGER POINT PARAGRAPH: Patient has documented myofascial spasms and trigger points in the muscle. Patient has trialed rehab (Home exercise, physical therapy or chiropractic care) and medications. We will schedule a series of 3  trigger points over 3-6 weeks. If 50% or greater relief for 6-8 weeks another can be repeated vs Botox.  Risk, benefits, pros and cons of procedure were explained to the patient using models and diagrams and their questions were answered.  Total clinician time spent today on the patient is 30 minutes including preparing to see the patient, obtaining and/or reviewing and confirming history, performing medically necessary and appropriate examination, counseling and educating the patient and/or family, documenting clinical information in the EHR and communicating and/or referring to other healthcare professionals.  DILAN Pete MD

## 2024-05-08 ENCOUNTER — APPOINTMENT (OUTPATIENT)
Dept: UROGYNECOLOGY | Facility: CLINIC | Age: 54
End: 2024-05-08
Payer: COMMERCIAL

## 2024-05-08 VITALS
BODY MASS INDEX: 25.99 KG/M2 | HEIGHT: 65 IN | WEIGHT: 156 LBS | SYSTOLIC BLOOD PRESSURE: 120 MMHG | HEART RATE: 79 BPM | DIASTOLIC BLOOD PRESSURE: 74 MMHG

## 2024-05-08 DIAGNOSIS — Z83.42 FAMILY HISTORY OF FAMILIAL HYPERCHOLESTEROLEMIA: ICD-10-CM

## 2024-05-08 DIAGNOSIS — D64.9 ANEMIA, UNSPECIFIED: ICD-10-CM

## 2024-05-08 DIAGNOSIS — G89.29 PAIN IN LEFT ANKLE AND JOINTS OF LEFT FOOT: ICD-10-CM

## 2024-05-08 DIAGNOSIS — M25.561 PAIN IN RIGHT KNEE: ICD-10-CM

## 2024-05-08 DIAGNOSIS — Z98.890 OTHER SPECIFIED POSTPROCEDURAL STATES: ICD-10-CM

## 2024-05-08 DIAGNOSIS — M76.62 ACHILLES TENDINITIS, LEFT LEG: ICD-10-CM

## 2024-05-08 DIAGNOSIS — L90.0 LICHEN SCLEROSUS ET ATROPHICUS: ICD-10-CM

## 2024-05-08 DIAGNOSIS — Z78.9 OTHER SPECIFIED HEALTH STATUS: ICD-10-CM

## 2024-05-08 DIAGNOSIS — Z82.49 FAMILY HISTORY OF ISCHEMIC HEART DISEASE AND OTHER DISEASES OF THE CIRCULATORY SYSTEM: ICD-10-CM

## 2024-05-08 DIAGNOSIS — M94.261 CHONDROMALACIA, RIGHT KNEE: ICD-10-CM

## 2024-05-08 DIAGNOSIS — M25.512 PAIN IN LEFT SHOULDER: ICD-10-CM

## 2024-05-08 DIAGNOSIS — G89.29 PAIN IN RIGHT ANKLE AND JOINTS OF RIGHT FOOT: ICD-10-CM

## 2024-05-08 DIAGNOSIS — M46.03: ICD-10-CM

## 2024-05-08 DIAGNOSIS — M94.262 CHONDROMALACIA, LEFT KNEE: ICD-10-CM

## 2024-05-08 DIAGNOSIS — F17.200 NICOTINE DEPENDENCE, UNSPECIFIED, UNCOMPLICATED: ICD-10-CM

## 2024-05-08 DIAGNOSIS — Z83.3 FAMILY HISTORY OF DIABETES MELLITUS: ICD-10-CM

## 2024-05-08 DIAGNOSIS — S93.402D SPRAIN OF UNSPECIFIED LIGAMENT OF LEFT ANKLE, SUBSEQUENT ENCOUNTER: ICD-10-CM

## 2024-05-08 DIAGNOSIS — M25.571 PAIN IN RIGHT ANKLE AND JOINTS OF RIGHT FOOT: ICD-10-CM

## 2024-05-08 DIAGNOSIS — G56.23 LESION OF ULNAR NERVE, BILATERAL UPPER LIMBS: ICD-10-CM

## 2024-05-08 DIAGNOSIS — S63.592A OTHER SPECIFIED SPRAIN OF LEFT WRIST, INITIAL ENCOUNTER: ICD-10-CM

## 2024-05-08 DIAGNOSIS — G47.30 SLEEP APNEA, UNSPECIFIED: ICD-10-CM

## 2024-05-08 DIAGNOSIS — M46.02 SPINAL ENTHESOPATHY, CERVICAL REGION: ICD-10-CM

## 2024-05-08 DIAGNOSIS — M79.18 MYALGIA, OTHER SITE: ICD-10-CM

## 2024-05-08 DIAGNOSIS — M25.572 PAIN IN LEFT ANKLE AND JOINTS OF LEFT FOOT: ICD-10-CM

## 2024-05-08 DIAGNOSIS — Z87.891 PERSONAL HISTORY OF NICOTINE DEPENDENCE: ICD-10-CM

## 2024-05-08 DIAGNOSIS — N39.41 URGE INCONTINENCE: ICD-10-CM

## 2024-05-08 PROCEDURE — 99205 OFFICE O/P NEW HI 60 MIN: CPT | Mod: 25

## 2024-05-08 PROCEDURE — 99459 PELVIC EXAMINATION: CPT

## 2024-05-08 PROCEDURE — 51701 INSERT BLADDER CATHETER: CPT

## 2024-05-08 RX ORDER — CHLORTHALIDONE 25 MG/1
25 TABLET ORAL
Refills: 0 | Status: ACTIVE | COMMUNITY

## 2024-05-08 RX ORDER — CALCIUM CARBONATE/VITAMIN D3 600 MG-10
TABLET ORAL
Refills: 0 | Status: ACTIVE | COMMUNITY

## 2024-05-08 RX ORDER — POTASSIUM CHLORIDE 10 MEQ
10 CAPSULE, EXTENDED RELEASE ORAL
Refills: 0 | Status: ACTIVE | COMMUNITY

## 2024-05-08 RX ORDER — METHYLPREDNISOLONE 4 MG/1
4 TABLET ORAL
Qty: 21 | Refills: 2 | Status: COMPLETED | COMMUNITY
Start: 2023-09-27 | End: 2024-05-08

## 2024-05-08 RX ORDER — CETIRIZINE HCL 10 MG
10 TABLET ORAL
Refills: 0 | Status: ACTIVE | COMMUNITY

## 2024-05-08 RX ORDER — CLOBETASOL PROPIONATE 0.5 MG/G
0.05 CREAM TOPICAL
Qty: 1 | Refills: 0 | Status: ACTIVE | COMMUNITY
Start: 2024-05-08 | End: 1900-01-01

## 2024-05-08 NOTE — REASON FOR VISIT
[TextEntry] : Reason for visit: New patient Voids per day:   15+ Voids per night:   6 Urge incontinence: Yes (+) frequency (+) leakage Stress incontinence: Rare Constipation: No   Fecal incontinence: No    Vaginal bulge: No

## 2024-05-08 NOTE — DISCUSSION/SUMMARY
[FreeTextEntry1] :  Urge incontinence- The pathophysiology of the above condition was discussed with the patient. The patient was given information and education on pelvic floor muscle exercises/rehabilitation, avoidance of dietary bladder irritants, and other strategies to improve bladder control, such as scheduled voiding. She was counseled regarding further management strategies for overactive bladder and urge incontinence including pelvic floor physical therapy, medications or surgical management. The patient voiced understanding and agrees with diet changes, controlling sleep apnea and a referral to PT. We will follow up on her urine tests.  Lichen sclerosis- Patient has a diagnosis of lichen on her shoulders. Labial lesions look suspicious for lichen sclerosis. Clobetasol was prescribed and was advised to followup for further evaluation and management with her general gynecologist. The patient voiced understanding.  Myalgia- Discussed the pathophysiology of the above condition. Reviewed management options including medications (oral or vaginal suppository), injections, pelvic floor physical therapy, or referral for possible pudendal nerve blocks. The patient agrees to a referral to PT.

## 2024-05-08 NOTE — END OF VISIT
[TextEntry] : 61m E&M, >50% spent in direct face to face counseling/coordination of care urge incontinence, lichen sclerosis, myalgia. This excludes cath. All questions answered. Patient reassured.

## 2024-05-08 NOTE — PHYSICAL EXAM
[Chaperone Present] : A chaperone was present in the examining room during all aspects of the physical examination [08433] : A chaperone was present during the pelvic exam. [FreeTextEntry2] : Hawa [FreeTextEntry1] : Void: 175 cc PVR:  20 cc Urethra was prepped in sterile fashion and then a sterile non- indwelling catheter (14F) was used by me to drain the bladder. The patient tolerated the procedure well. Indication: Urge Incontinence  Well healed incision: RUQ, vertical, Pfannenstiel normal perineal sensation normal perineal reflexes - cough stress test + atrophy no prolapse fixed urethra + bilateral levator ani spasm,  + tenderness - urethral tenderness - bladder tenderness - cervical tenderness 0/5 Kegel + white changes on bilateral labia

## 2024-05-08 NOTE — COUNSELING
[FreeTextEntry1] :  We will notify you of the urine results if they are abnormal.  For 4-5 days, cut one item from the list out of your diet.  After 4-5 days, it it makes a difference, you have to decide if it is worth keeping it out of your diet to help with the urinary issues.  If it does not make a difference, you should add it back into your diet and remove another item for another 4-5 days.  Please work on your sleep apnea control  Please apply a pea size amount of the clobetasol cream  to the labia twice a day for 7 days, followed by once a day for another 7 days, followed by 1-2 times per week  Please call my office if you have any issues with the cost or side effects of the medication.  Please followup with your general gynecologist for the possible labial lichen sclerosis  Referral to pelvic floor physical therapy  [facility] [address] [place] [address]   [facility] [address] [place] [address] Phone: [phone]  Please call the office if you feel like you do not have enough improvement of your symptoms towards the end of your physical therapy treatment so that we can arrange the next step of management.  Follow up as needed

## 2024-05-08 NOTE — HISTORY OF PRESENT ILLNESS
[FreeTextEntry1] : Pt with pelvic floor dysfunction here for urogynecologic evaluation. She describes:   Chief PFD: urinary incontinence, can't have sex for years  Pelvic organ prolapse: s/p c/s x1, s/p [facility], s/p umbilical hernia repair, no bulge, denies splinting Stress urinary incontinence: min Overactive bladder syndrome: hx of sleep apnea not using the machine, daily frequenc, urgency and urge incontinence, intermittent eneuresis, for years, no prior treatment, no glaucoma Voiding dysfunction: no Incomplete bladder emptying, no hesitancy  Lower urinary tract/vaginal symptoms: no recurrent UTIs per year, no hematuria, no dysuria, no bladder pain  Fecal incontinence: denies Defecatory dysfunction: sausage Sexual dysfunction: not active for years because of dryness  Pelvic pain: denies, takes flexeril 10mg as needed for pain in shoulder Vaginal dryness yes, told she can't use estrogen cream because of family history?   Her pelvic floor symptoms are significantly bothersome and negatively impacting her quality of life.

## 2024-05-13 LAB — URINE CULTURE <10: ABNORMAL

## 2024-05-13 RX ORDER — FLUCONAZOLE 150 MG/1
150 TABLET ORAL
Qty: 2 | Refills: 0 | Status: ACTIVE | COMMUNITY
Start: 2024-05-13 | End: 1900-01-01

## 2024-05-13 RX ORDER — AMPICILLIN 500 MG/1
500 CAPSULE ORAL
Qty: 14 | Refills: 0 | Status: ACTIVE | COMMUNITY
Start: 2024-05-13 | End: 1900-01-01

## 2024-09-24 ENCOUNTER — APPOINTMENT (OUTPATIENT)
Dept: PULMONOLOGY | Facility: CLINIC | Age: 54
End: 2024-09-24

## 2024-12-28 ENCOUNTER — APPOINTMENT (OUTPATIENT)
Dept: ORTHOPEDIC SURGERY | Facility: CLINIC | Age: 54
End: 2024-12-28
Payer: COMMERCIAL

## 2024-12-28 DIAGNOSIS — M25.561 PAIN IN RIGHT KNEE: ICD-10-CM

## 2024-12-28 DIAGNOSIS — M16.11 UNILATERAL PRIMARY OSTEOARTHRITIS, RIGHT HIP: ICD-10-CM

## 2024-12-28 PROCEDURE — 99214 OFFICE O/P EST MOD 30 MIN: CPT

## 2024-12-28 RX ORDER — MELOXICAM 15 MG/1
15 TABLET ORAL
Qty: 30 | Refills: 0 | Status: ACTIVE | COMMUNITY
Start: 2024-12-28 | End: 1900-01-01

## 2025-02-11 ENCOUNTER — APPOINTMENT (OUTPATIENT)
Dept: ORTHOPEDIC SURGERY | Facility: CLINIC | Age: 55
End: 2025-02-11
Payer: COMMERCIAL

## 2025-02-11 DIAGNOSIS — M16.11 UNILATERAL PRIMARY OSTEOARTHRITIS, RIGHT HIP: ICD-10-CM

## 2025-02-11 DIAGNOSIS — M25.561 PAIN IN RIGHT KNEE: ICD-10-CM

## 2025-02-11 PROCEDURE — 99213 OFFICE O/P EST LOW 20 MIN: CPT

## 2025-03-11 ENCOUNTER — APPOINTMENT (OUTPATIENT)
Dept: ORTHOPEDIC SURGERY | Facility: CLINIC | Age: 55
End: 2025-03-11
Payer: COMMERCIAL

## 2025-03-11 DIAGNOSIS — M25.561 PAIN IN RIGHT KNEE: ICD-10-CM

## 2025-03-11 PROCEDURE — 99213 OFFICE O/P EST LOW 20 MIN: CPT

## 2025-03-13 ENCOUNTER — APPOINTMENT (OUTPATIENT)
Dept: PAIN MANAGEMENT | Facility: CLINIC | Age: 55
End: 2025-03-13
Payer: COMMERCIAL

## 2025-03-13 DIAGNOSIS — M24.151 OTHER ARTICULAR CARTILAGE DISORDERS, RIGHT HIP: ICD-10-CM

## 2025-03-13 PROCEDURE — 99214 OFFICE O/P EST MOD 30 MIN: CPT

## 2025-03-13 PROCEDURE — 99204 OFFICE O/P NEW MOD 45 MIN: CPT

## 2025-03-17 DIAGNOSIS — M16.11 UNILATERAL PRIMARY OSTEOARTHRITIS, RIGHT HIP: ICD-10-CM

## 2025-03-17 RX ORDER — METHYLPREDNISOLONE 4 MG/1
4 TABLET ORAL
Qty: 1 | Refills: 0 | Status: ACTIVE | COMMUNITY
Start: 2025-03-17 | End: 1900-01-01

## 2025-04-10 ENCOUNTER — RX RENEWAL (OUTPATIENT)
Age: 55
End: 2025-04-10

## 2025-05-06 ENCOUNTER — APPOINTMENT (OUTPATIENT)
Dept: ORTHOPEDIC SURGERY | Facility: CLINIC | Age: 55
End: 2025-05-06

## 2025-05-19 ENCOUNTER — RX RENEWAL (OUTPATIENT)
Age: 55
End: 2025-05-19

## 2025-06-23 ENCOUNTER — RX RENEWAL (OUTPATIENT)
Age: 55
End: 2025-06-23

## 2025-07-04 ENCOUNTER — NON-APPOINTMENT (OUTPATIENT)
Age: 55
End: 2025-07-04

## 2025-08-04 ENCOUNTER — RX RENEWAL (OUTPATIENT)
Age: 55
End: 2025-08-04